# Patient Record
Sex: MALE | Race: WHITE | NOT HISPANIC OR LATINO | Employment: OTHER | RURAL
[De-identification: names, ages, dates, MRNs, and addresses within clinical notes are randomized per-mention and may not be internally consistent; named-entity substitution may affect disease eponyms.]

---

## 2023-01-01 ENCOUNTER — HOSPITAL ENCOUNTER (INPATIENT)
Facility: HOSPITAL | Age: 83
LOS: 21 days | DRG: 947 | End: 2023-05-25
Attending: HOSPITALIST | Admitting: HOSPITALIST
Payer: MEDICARE

## 2023-01-01 VITALS
SYSTOLIC BLOOD PRESSURE: 155 MMHG | RESPIRATION RATE: 24 BRPM | HEART RATE: 100 BPM | WEIGHT: 204.19 LBS | TEMPERATURE: 97 F | OXYGEN SATURATION: 91 % | BODY MASS INDEX: 32.82 KG/M2 | DIASTOLIC BLOOD PRESSURE: 73 MMHG | HEIGHT: 66 IN

## 2023-01-01 DIAGNOSIS — J96.00 ACUTE RESPIRATORY FAILURE: ICD-10-CM

## 2023-01-01 DIAGNOSIS — R53.81 PHYSICAL DECONDITIONING: Primary | ICD-10-CM

## 2023-01-01 LAB
ANION GAP SERPL CALCULATED.3IONS-SCNC: 5 MMOL/L (ref 7–16)
ANION GAP SERPL CALCULATED.3IONS-SCNC: 7 MMOL/L (ref 7–16)
ANION GAP SERPL CALCULATED.3IONS-SCNC: 8 MMOL/L (ref 7–16)
BASOPHILS # BLD AUTO: 0.03 K/UL (ref 0–0.2)
BASOPHILS # BLD AUTO: 0.04 K/UL (ref 0–0.2)
BASOPHILS # BLD AUTO: 0.07 K/UL (ref 0–0.2)
BASOPHILS NFR BLD AUTO: 0.3 % (ref 0–1)
BASOPHILS NFR BLD AUTO: 0.3 % (ref 0–1)
BASOPHILS NFR BLD AUTO: 0.6 % (ref 0–1)
BUN SERPL-MCNC: 11 MG/DL (ref 7–18)
BUN SERPL-MCNC: 11 MG/DL (ref 7–18)
BUN SERPL-MCNC: 15 MG/DL (ref 7–18)
BUN/CREAT SERPL: 17 (ref 6–20)
BUN/CREAT SERPL: 20 (ref 6–20)
BUN/CREAT SERPL: 22 (ref 6–20)
C COLI+JEJ+UPSA DNA STL QL NAA+NON-PROBE: NEGATIVE
CALCIUM SERPL-MCNC: 8.8 MG/DL (ref 8.5–10.1)
CALCIUM SERPL-MCNC: 8.8 MG/DL (ref 8.5–10.1)
CALCIUM SERPL-MCNC: 9 MG/DL (ref 8.5–10.1)
CHLORIDE SERPL-SCNC: 100 MMOL/L (ref 98–107)
CHLORIDE SERPL-SCNC: 95 MMOL/L (ref 98–107)
CHLORIDE SERPL-SCNC: 96 MMOL/L (ref 98–107)
CO2 SERPL-SCNC: 36 MMOL/L (ref 21–32)
CO2 SERPL-SCNC: 37 MMOL/L (ref 21–32)
CO2 SERPL-SCNC: 41 MMOL/L (ref 21–32)
CREAT SERPL-MCNC: 0.5 MG/DL (ref 0.7–1.3)
CREAT SERPL-MCNC: 0.64 MG/DL (ref 0.7–1.3)
CREAT SERPL-MCNC: 0.76 MG/DL (ref 0.7–1.3)
DIFFERENTIAL METHOD BLD: ABNORMAL
E COLI SXT1 STL QL IA: NEGATIVE
E COLI SXT2 STL QL IA: NEGATIVE
EGFR (NO RACE VARIABLE) (RUSH/TITUS): 103 ML/MIN/1.73M2
EGFR (NO RACE VARIABLE) (RUSH/TITUS): 91 ML/MIN/1.73M2
EGFR (NO RACE VARIABLE) (RUSH/TITUS): 96 ML/MIN/1.73M2
EOSINOPHIL # BLD AUTO: 0.15 K/UL (ref 0–0.5)
EOSINOPHIL # BLD AUTO: 0.15 K/UL (ref 0–0.5)
EOSINOPHIL # BLD AUTO: 0.48 K/UL (ref 0–0.5)
EOSINOPHIL NFR BLD AUTO: 1.1 % (ref 1–4)
EOSINOPHIL NFR BLD AUTO: 1.2 % (ref 1–4)
EOSINOPHIL NFR BLD AUTO: 4.3 % (ref 1–4)
ERYTHROCYTE [DISTWIDTH] IN BLOOD BY AUTOMATED COUNT: 16.7 % (ref 11.5–14.5)
ERYTHROCYTE [DISTWIDTH] IN BLOOD BY AUTOMATED COUNT: 17.2 % (ref 11.5–14.5)
ERYTHROCYTE [DISTWIDTH] IN BLOOD BY AUTOMATED COUNT: 18 % (ref 11.5–14.5)
GLUCOSE SERPL-MCNC: 132 MG/DL (ref 74–106)
GLUCOSE SERPL-MCNC: 133 MG/DL (ref 74–106)
GLUCOSE SERPL-MCNC: 142 MG/DL (ref 74–106)
HCT VFR BLD AUTO: 34.2 % (ref 40–54)
HCT VFR BLD AUTO: 35.7 % (ref 40–54)
HCT VFR BLD AUTO: 37.1 % (ref 40–54)
HGB BLD-MCNC: 10.3 G/DL (ref 13.5–18)
HGB BLD-MCNC: 10.8 G/DL (ref 13.5–18)
HGB BLD-MCNC: 11.2 G/DL (ref 13.5–18)
LYMPHOCYTES # BLD AUTO: 1.31 K/UL (ref 1–4.8)
LYMPHOCYTES # BLD AUTO: 1.44 K/UL (ref 1–4.8)
LYMPHOCYTES # BLD AUTO: 1.45 K/UL (ref 1–4.8)
LYMPHOCYTES NFR BLD AUTO: 10.4 % (ref 27–41)
LYMPHOCYTES NFR BLD AUTO: 10.6 % (ref 27–41)
LYMPHOCYTES NFR BLD AUTO: 12.9 % (ref 27–41)
MCH RBC QN AUTO: 26.5 PG (ref 27–31)
MCH RBC QN AUTO: 26.6 PG (ref 27–31)
MCH RBC QN AUTO: 26.7 PG (ref 27–31)
MCHC RBC AUTO-ENTMCNC: 30.1 G/DL (ref 32–36)
MCHC RBC AUTO-ENTMCNC: 30.2 G/DL (ref 32–36)
MCHC RBC AUTO-ENTMCNC: 30.3 G/DL (ref 32–36)
MCV RBC AUTO: 87.7 FL (ref 80–96)
MCV RBC AUTO: 87.9 FL (ref 80–96)
MCV RBC AUTO: 88.6 FL (ref 80–96)
MONOCYTES # BLD AUTO: 1.07 K/UL (ref 0–0.8)
MONOCYTES # BLD AUTO: 1.19 K/UL (ref 0–0.8)
MONOCYTES # BLD AUTO: 1.4 K/UL (ref 0–0.8)
MONOCYTES NFR BLD AUTO: 10 % (ref 2–6)
MONOCYTES NFR BLD AUTO: 10.7 % (ref 2–6)
MONOCYTES NFR BLD AUTO: 8.6 % (ref 2–6)
MPC BLD CALC-MCNC: 8.7 FL (ref 9.4–12.4)
MPC BLD CALC-MCNC: 9.1 FL (ref 9.4–12.4)
MPC BLD CALC-MCNC: 9.1 FL (ref 9.4–12.4)
NEUTROPHILS # BLD AUTO: 10.9 K/UL (ref 1.8–7.7)
NEUTROPHILS # BLD AUTO: 8.02 K/UL (ref 1.8–7.7)
NEUTROPHILS # BLD AUTO: 9.81 K/UL (ref 1.8–7.7)
NEUTROPHILS NFR BLD AUTO: 71.8 % (ref 53–65)
NEUTROPHILS NFR BLD AUTO: 78.2 % (ref 53–65)
NEUTROPHILS NFR BLD AUTO: 79 % (ref 53–65)
NOROVIRUS GI+II RNA STL QL NAA+NON-PROBE: NEGATIVE
PLATELET # BLD AUTO: 268 K/UL (ref 150–400)
PLATELET # BLD AUTO: 306 K/UL (ref 150–400)
PLATELET # BLD AUTO: 323 K/UL (ref 150–400)
POTASSIUM SERPL-SCNC: 3.1 MMOL/L (ref 3.5–5.1)
POTASSIUM SERPL-SCNC: 3.7 MMOL/L (ref 3.5–5.1)
POTASSIUM SERPL-SCNC: 3.8 MMOL/L (ref 3.5–5.1)
RBC # BLD AUTO: 3.86 M/UL (ref 4.6–6.2)
RBC # BLD AUTO: 4.06 M/UL (ref 4.6–6.2)
RBC # BLD AUTO: 4.23 M/UL (ref 4.6–6.2)
RVA RNA STL QL NAA+NON-PROBE: NEGATIVE
S ENT+BONG DNA STL QL NAA+NON-PROBE: NEGATIVE
SHIGELLA SPECIES NAT: NEGATIVE
SODIUM SERPL-SCNC: 136 MMOL/L (ref 136–145)
SODIUM SERPL-SCNC: 136 MMOL/L (ref 136–145)
SODIUM SERPL-SCNC: 142 MMOL/L (ref 136–145)
V CHOL+PARA+VUL DNA STL QL NAA+NON-PROBE: NEGATIVE
WBC # BLD AUTO: 11.16 K/UL (ref 4.5–11)
WBC # BLD AUTO: 12.41 K/UL (ref 4.5–11)
WBC # BLD AUTO: 13.94 K/UL (ref 4.5–11)
Y ENTEROCOL DNA STL QL NAA+NON-PROBE: NEGATIVE

## 2023-01-01 PROCEDURE — 27000987 HC MATTRESS, MATRIX LOW PROFILE

## 2023-01-01 PROCEDURE — 99307 PR NURSING FAC CARE, SUBSEQ, IMPROVING: ICD-10-PCS | Mod: ,,, | Performed by: HOSPITALIST

## 2023-01-01 PROCEDURE — 87506 IADNA-DNA/RNA PROBE TQ 6-11: CPT | Performed by: HOSPITALIST

## 2023-01-01 PROCEDURE — 27000958

## 2023-01-01 PROCEDURE — 99305 1ST NF CARE MODERATE MDM 35: CPT | Mod: AI,,, | Performed by: HOSPITALIST

## 2023-01-01 PROCEDURE — 25000003 PHARM REV CODE 250: Performed by: HOSPITALIST

## 2023-01-01 PROCEDURE — 97535 SELF CARE MNGMENT TRAINING: CPT

## 2023-01-01 PROCEDURE — 27000221 HC OXYGEN, UP TO 24 HOURS

## 2023-01-01 PROCEDURE — 25000003 PHARM REV CODE 250

## 2023-01-01 PROCEDURE — 25000003 PHARM REV CODE 250: Performed by: NURSE PRACTITIONER

## 2023-01-01 PROCEDURE — 99900035 HC TECH TIME PER 15 MIN (STAT)

## 2023-01-01 PROCEDURE — 97110 THERAPEUTIC EXERCISES: CPT

## 2023-01-01 PROCEDURE — 11000004 HC SNF PRIVATE

## 2023-01-01 PROCEDURE — 97530 THERAPEUTIC ACTIVITIES: CPT | Mod: CQ

## 2023-01-01 PROCEDURE — 94761 N-INVAS EAR/PLS OXIMETRY MLT: CPT

## 2023-01-01 PROCEDURE — 25000242 PHARM REV CODE 250 ALT 637 W/ HCPCS: Performed by: NURSE PRACTITIONER

## 2023-01-01 PROCEDURE — 97116 GAIT TRAINING THERAPY: CPT | Mod: CQ

## 2023-01-01 PROCEDURE — 97530 THERAPEUTIC ACTIVITIES: CPT

## 2023-01-01 PROCEDURE — 25000242 PHARM REV CODE 250 ALT 637 W/ HCPCS: Performed by: HOSPITALIST

## 2023-01-01 PROCEDURE — 30200315 PPD INTRADERMAL TEST REV CODE 302: Performed by: HOSPITALIST

## 2023-01-01 PROCEDURE — 94640 AIRWAY INHALATION TREATMENT: CPT

## 2023-01-01 PROCEDURE — 94760 N-INVAS EAR/PLS OXIMETRY 1: CPT

## 2023-01-01 PROCEDURE — 99308 PR NURSING FAC CARE, SUBSEQ, MINOR COMPLIC: ICD-10-PCS | Mod: ,,, | Performed by: HOSPITALIST

## 2023-01-01 PROCEDURE — 97110 THERAPEUTIC EXERCISES: CPT | Mod: CQ

## 2023-01-01 PROCEDURE — 80048 BASIC METABOLIC PNL TOTAL CA: CPT | Performed by: HOSPITALIST

## 2023-01-01 PROCEDURE — 86580 TB INTRADERMAL TEST: CPT | Performed by: HOSPITALIST

## 2023-01-01 PROCEDURE — 97116 GAIT TRAINING THERAPY: CPT

## 2023-01-01 PROCEDURE — 99310 PR NURSING FAC CARE, SUBSEQ, UNSTABLE PT: ICD-10-PCS | Mod: ,,, | Performed by: STUDENT IN AN ORGANIZED HEALTH CARE EDUCATION/TRAINING PROGRAM

## 2023-01-01 PROCEDURE — 99308 SBSQ NF CARE LOW MDM 20: CPT | Mod: ,,, | Performed by: HOSPITALIST

## 2023-01-01 PROCEDURE — 97163 PT EVAL HIGH COMPLEX 45 MIN: CPT

## 2023-01-01 PROCEDURE — 97166 OT EVAL MOD COMPLEX 45 MIN: CPT

## 2023-01-01 PROCEDURE — 99310 SBSQ NF CARE HIGH MDM 45: CPT | Mod: ,,, | Performed by: STUDENT IN AN ORGANIZED HEALTH CARE EDUCATION/TRAINING PROGRAM

## 2023-01-01 PROCEDURE — 85025 COMPLETE CBC W/AUTO DIFF WBC: CPT | Performed by: HOSPITALIST

## 2023-01-01 PROCEDURE — 63600175 PHARM REV CODE 636 W HCPCS: Performed by: NURSE PRACTITIONER

## 2023-01-01 PROCEDURE — 99307 SBSQ NF CARE SF MDM 10: CPT | Mod: ,,, | Performed by: HOSPITALIST

## 2023-01-01 PROCEDURE — 51702 INSERT TEMP BLADDER CATH: CPT

## 2023-01-01 PROCEDURE — 99900039 HC SLP GENERIC THERAPY SCREENING (STAT)

## 2023-01-01 PROCEDURE — 63600175 PHARM REV CODE 636 W HCPCS: Performed by: STUDENT IN AN ORGANIZED HEALTH CARE EDUCATION/TRAINING PROGRAM

## 2023-01-01 PROCEDURE — 25000242 PHARM REV CODE 250 ALT 637 W/ HCPCS

## 2023-01-01 PROCEDURE — 99305 PR NURSING FACILITY CARE, INIT, MOD SEVERITY: ICD-10-PCS | Mod: AI,,, | Performed by: HOSPITALIST

## 2023-01-01 RX ORDER — MORPHINE SULFATE 4 MG/ML
4 INJECTION, SOLUTION INTRAMUSCULAR; INTRAVENOUS ONCE
Status: COMPLETED | OUTPATIENT
Start: 2023-01-01 | End: 2023-01-01

## 2023-01-01 RX ORDER — ALBUTEROL SULFATE 0.83 MG/ML
2.5 SOLUTION RESPIRATORY (INHALATION) EVERY 4 HOURS PRN
Status: DISCONTINUED | OUTPATIENT
Start: 2023-01-01 | End: 2023-01-01 | Stop reason: HOSPADM

## 2023-01-01 RX ORDER — IPRATROPIUM BROMIDE AND ALBUTEROL SULFATE 2.5; .5 MG/3ML; MG/3ML
3 SOLUTION RESPIRATORY (INHALATION) EVERY 6 HOURS PRN
Status: DISCONTINUED | OUTPATIENT
Start: 2023-01-01 | End: 2023-01-01 | Stop reason: CLARIF

## 2023-01-01 RX ORDER — POTASSIUM CHLORIDE 20 MEQ/1
20 TABLET, EXTENDED RELEASE ORAL DAILY
Status: DISCONTINUED | OUTPATIENT
Start: 2023-01-01 | End: 2023-01-01

## 2023-01-01 RX ORDER — ACETAMINOPHEN 650 MG/1
650 SUPPOSITORY RECTAL EVERY 4 HOURS PRN
Status: DISCONTINUED | OUTPATIENT
Start: 2023-01-01 | End: 2023-01-01 | Stop reason: HOSPADM

## 2023-01-01 RX ORDER — HYDROXYZINE PAMOATE 25 MG/1
25 CAPSULE ORAL EVERY 6 HOURS PRN
Status: DISCONTINUED | OUTPATIENT
Start: 2023-01-01 | End: 2023-01-01

## 2023-01-01 RX ORDER — CLOPIDOGREL BISULFATE 75 MG/1
75 TABLET ORAL DAILY
Status: DISCONTINUED | OUTPATIENT
Start: 2023-01-01 | End: 2023-01-01

## 2023-01-01 RX ORDER — DILTIAZEM HYDROCHLORIDE 120 MG/1
240 CAPSULE, COATED, EXTENDED RELEASE ORAL DAILY
Status: DISCONTINUED | OUTPATIENT
Start: 2023-01-01 | End: 2023-01-01

## 2023-01-01 RX ORDER — IPRATROPIUM BROMIDE AND ALBUTEROL SULFATE 2.5; .5 MG/3ML; MG/3ML
3 SOLUTION RESPIRATORY (INHALATION) EVERY 6 HOURS
Status: DISCONTINUED | OUTPATIENT
Start: 2023-01-01 | End: 2023-01-01 | Stop reason: HOSPADM

## 2023-01-01 RX ORDER — ACETYLCYSTEINE 200 MG/ML
2 SOLUTION ORAL; RESPIRATORY (INHALATION) 3 TIMES DAILY PRN
Status: DISCONTINUED | OUTPATIENT
Start: 2023-01-01 | End: 2023-01-01 | Stop reason: HOSPADM

## 2023-01-01 RX ORDER — ROPINIROLE 0.5 MG/1
0.5 TABLET, FILM COATED ORAL 2 TIMES DAILY
COMMUNITY

## 2023-01-01 RX ORDER — MORPHINE SULFATE 4 MG/ML
4 INJECTION, SOLUTION INTRAMUSCULAR; INTRAVENOUS EVERY 4 HOURS PRN
Status: DISCONTINUED | OUTPATIENT
Start: 2023-01-01 | End: 2023-01-01 | Stop reason: HOSPADM

## 2023-01-01 RX ORDER — DILTIAZEM HYDROCHLORIDE 240 MG/1
240 CAPSULE, EXTENDED RELEASE ORAL DAILY
COMMUNITY
Start: 2023-01-01

## 2023-01-01 RX ORDER — FUROSEMIDE 20 MG/1
20 TABLET ORAL 2 TIMES DAILY
COMMUNITY

## 2023-01-01 RX ORDER — FLUTICASONE PROPIONATE AND SALMETEROL 250; 50 UG/1; UG/1
1 POWDER RESPIRATORY (INHALATION) 2 TIMES DAILY
Status: DISCONTINUED | OUTPATIENT
Start: 2023-01-01 | End: 2023-01-01

## 2023-01-01 RX ORDER — ALBUTEROL SULFATE 0.83 MG/ML
2.5 SOLUTION RESPIRATORY (INHALATION) EVERY 6 HOURS
Status: DISCONTINUED | OUTPATIENT
Start: 2023-01-01 | End: 2023-01-01 | Stop reason: ALTCHOICE

## 2023-01-01 RX ORDER — CHOLECALCIFEROL (VITAMIN D3) 25 MCG
2000 TABLET ORAL DAILY
COMMUNITY

## 2023-01-01 RX ORDER — TRAMADOL HYDROCHLORIDE 50 MG/1
50 TABLET ORAL EVERY 6 HOURS PRN
Status: DISCONTINUED | OUTPATIENT
Start: 2023-01-01 | End: 2023-01-01

## 2023-01-01 RX ORDER — FUROSEMIDE 40 MG/1
40 TABLET ORAL DAILY
Status: DISCONTINUED | OUTPATIENT
Start: 2023-01-01 | End: 2023-01-01

## 2023-01-01 RX ORDER — FERROUS SULFATE, DRIED 160(50) MG
1 TABLET, EXTENDED RELEASE ORAL DAILY
Status: DISCONTINUED | OUTPATIENT
Start: 2023-01-01 | End: 2023-01-01

## 2023-01-01 RX ORDER — ACETAMINOPHEN 325 MG/1
650 TABLET ORAL EVERY 6 HOURS PRN
Status: DISCONTINUED | OUTPATIENT
Start: 2023-01-01 | End: 2023-01-01

## 2023-01-01 RX ORDER — MUPIROCIN 20 MG/G
OINTMENT TOPICAL 2 TIMES DAILY
Status: DISCONTINUED | OUTPATIENT
Start: 2023-01-01 | End: 2023-01-01 | Stop reason: HOSPADM

## 2023-01-01 RX ORDER — MORPHINE SULFATE 4 MG/ML
4 INJECTION, SOLUTION INTRAMUSCULAR; INTRAVENOUS
Status: COMPLETED | OUTPATIENT
Start: 2023-01-01 | End: 2023-01-01

## 2023-01-01 RX ORDER — FLUTICASONE PROPIONATE AND SALMETEROL 250; 50 UG/1; UG/1
1 POWDER RESPIRATORY (INHALATION) 2 TIMES DAILY
COMMUNITY

## 2023-01-01 RX ORDER — CALCIUM CARBONATE 200(500)MG
500 TABLET,CHEWABLE ORAL 2 TIMES DAILY PRN
Status: DISCONTINUED | OUTPATIENT
Start: 2023-01-01 | End: 2023-01-01

## 2023-01-01 RX ORDER — POTASSIUM CHLORIDE 20 MEQ/1
40 TABLET, EXTENDED RELEASE ORAL DAILY
Status: DISCONTINUED | OUTPATIENT
Start: 2023-01-01 | End: 2023-01-01

## 2023-01-01 RX ORDER — ASPIRIN 81 MG/1
81 TABLET ORAL DAILY
COMMUNITY

## 2023-01-01 RX ORDER — BUDESONIDE 0.5 MG/2ML
0.5 INHALANT ORAL EVERY 12 HOURS
Status: DISCONTINUED | OUTPATIENT
Start: 2023-01-01 | End: 2023-01-01 | Stop reason: HOSPADM

## 2023-01-01 RX ORDER — MORPHINE SULFATE 4 MG/ML
3 INJECTION, SOLUTION INTRAMUSCULAR; INTRAVENOUS ONCE
Status: COMPLETED | OUTPATIENT
Start: 2023-01-01 | End: 2023-01-01

## 2023-01-01 RX ORDER — SERTRALINE HYDROCHLORIDE 25 MG/1
50 TABLET, FILM COATED ORAL NIGHTLY
Status: DISCONTINUED | OUTPATIENT
Start: 2023-01-01 | End: 2023-01-01

## 2023-01-01 RX ORDER — FUROSEMIDE 10 MG/ML
40 INJECTION INTRAMUSCULAR; INTRAVENOUS DAILY
Status: DISCONTINUED | OUTPATIENT
Start: 2023-01-01 | End: 2023-01-01 | Stop reason: HOSPADM

## 2023-01-01 RX ORDER — MORPHINE SULFATE 4 MG/ML
2 INJECTION, SOLUTION INTRAMUSCULAR; INTRAVENOUS
Status: DISCONTINUED | OUTPATIENT
Start: 2023-01-01 | End: 2023-01-01 | Stop reason: HOSPADM

## 2023-01-01 RX ORDER — CLOPIDOGREL BISULFATE 75 MG/1
75 TABLET ORAL DAILY
COMMUNITY
Start: 2022-01-01

## 2023-01-01 RX ORDER — SERTRALINE HYDROCHLORIDE 25 MG/1
50 TABLET, FILM COATED ORAL NIGHTLY
COMMUNITY

## 2023-01-01 RX ORDER — BUDESONIDE 0.5 MG/2ML
0.5 INHALANT ORAL EVERY 12 HOURS
Status: DISCONTINUED | OUTPATIENT
Start: 2023-01-01 | End: 2023-01-01

## 2023-01-01 RX ORDER — HYDROXYZINE PAMOATE 25 MG/1
50 CAPSULE ORAL NIGHTLY
Status: DISCONTINUED | OUTPATIENT
Start: 2023-01-01 | End: 2023-01-01

## 2023-01-01 RX ORDER — BUDESONIDE 0.5 MG/2ML
INHALANT ORAL
Status: COMPLETED
Start: 2023-01-01 | End: 2023-01-01

## 2023-01-01 RX ORDER — GUAIFENESIN 600 MG/1
600 TABLET, EXTENDED RELEASE ORAL 2 TIMES DAILY
Status: DISCONTINUED | OUTPATIENT
Start: 2023-01-01 | End: 2023-01-01

## 2023-01-01 RX ORDER — FUROSEMIDE 20 MG/1
20 TABLET ORAL 2 TIMES DAILY
Status: DISCONTINUED | OUTPATIENT
Start: 2023-01-01 | End: 2023-01-01

## 2023-01-01 RX ORDER — POTASSIUM CHLORIDE 20 MEQ/1
20 TABLET, EXTENDED RELEASE ORAL DAILY
COMMUNITY

## 2023-01-01 RX ORDER — SIMVASTATIN 40 MG/1
40 TABLET, FILM COATED ORAL NIGHTLY
COMMUNITY

## 2023-01-01 RX ORDER — ATORVASTATIN CALCIUM 20 MG/1
20 TABLET, FILM COATED ORAL NIGHTLY
Status: DISCONTINUED | OUTPATIENT
Start: 2023-01-01 | End: 2023-01-01

## 2023-01-01 RX ORDER — TALC
6 POWDER (GRAM) TOPICAL NIGHTLY PRN
Status: DISCONTINUED | OUTPATIENT
Start: 2023-01-01 | End: 2023-01-01

## 2023-01-01 RX ORDER — AMOXICILLIN 250 MG
1 CAPSULE ORAL 2 TIMES DAILY PRN
Status: DISCONTINUED | OUTPATIENT
Start: 2023-01-01 | End: 2023-01-01

## 2023-01-01 RX ORDER — ROPINIROLE 0.25 MG/1
0.5 TABLET, FILM COATED ORAL 2 TIMES DAILY
Status: DISCONTINUED | OUTPATIENT
Start: 2023-01-01 | End: 2023-01-01

## 2023-01-01 RX ORDER — ASPIRIN 81 MG/1
81 TABLET ORAL DAILY
Status: DISCONTINUED | OUTPATIENT
Start: 2023-01-01 | End: 2023-01-01

## 2023-01-01 RX ADMIN — MUPIROCIN: 20 OINTMENT TOPICAL at 10:05

## 2023-01-01 RX ADMIN — ROPINIROLE 0.5 MG: 0.25 TABLET, FILM COATED ORAL at 08:05

## 2023-01-01 RX ADMIN — FLUTICASONE PROPIONATE AND SALMETEROL 1 PUFF: 250; 50 POWDER RESPIRATORY (INHALATION) at 07:05

## 2023-01-01 RX ADMIN — FLUTICASONE PROPIONATE AND SALMETEROL 1 PUFF: 250; 50 POWDER RESPIRATORY (INHALATION) at 08:05

## 2023-01-01 RX ADMIN — ZINC OXIDE TOPICAL OINT.: at 08:05

## 2023-01-01 RX ADMIN — CLOPIDOGREL BISULFATE 75 MG: 75 TABLET, FILM COATED ORAL at 09:05

## 2023-01-01 RX ADMIN — Medication 1 TABLET: at 08:05

## 2023-01-01 RX ADMIN — ACETAMINOPHEN 650 MG: 325 TABLET ORAL at 04:05

## 2023-01-01 RX ADMIN — SERTRALINE HYDROCHLORIDE 50 MG: 25 TABLET ORAL at 08:05

## 2023-01-01 RX ADMIN — GUAIFENESIN 600 MG: 600 TABLET ORAL at 08:05

## 2023-01-01 RX ADMIN — TRAMADOL HYDROCHLORIDE 50 MG: 50 TABLET, COATED ORAL at 08:05

## 2023-01-01 RX ADMIN — DILTIAZEM HYDROCHLORIDE 240 MG: 120 CAPSULE, COATED, EXTENDED RELEASE ORAL at 08:05

## 2023-01-01 RX ADMIN — TRAMADOL HYDROCHLORIDE 50 MG: 50 TABLET, COATED ORAL at 01:05

## 2023-01-01 RX ADMIN — HYDROXYZINE PAMOATE 25 MG: 25 CAPSULE ORAL at 08:05

## 2023-01-01 RX ADMIN — BUDESONIDE INHALATION 0.5 MG: 0.5 SUSPENSION RESPIRATORY (INHALATION) at 08:05

## 2023-01-01 RX ADMIN — ATORVASTATIN CALCIUM 20 MG: 20 TABLET, FILM COATED ORAL at 08:05

## 2023-01-01 RX ADMIN — MELATONIN 6 MG: at 09:05

## 2023-01-01 RX ADMIN — TRAMADOL HYDROCHLORIDE 50 MG: 50 TABLET, COATED ORAL at 10:05

## 2023-01-01 RX ADMIN — CLOPIDOGREL BISULFATE 75 MG: 75 TABLET, FILM COATED ORAL at 08:05

## 2023-01-01 RX ADMIN — ALBUTEROL SULFATE 2.5 MG: 2.5 SOLUTION RESPIRATORY (INHALATION) at 09:05

## 2023-01-01 RX ADMIN — ASPIRIN 81 MG: 81 TABLET, COATED ORAL at 08:05

## 2023-01-01 RX ADMIN — POTASSIUM CHLORIDE 20 MEQ: 1500 TABLET, EXTENDED RELEASE ORAL at 08:05

## 2023-01-01 RX ADMIN — SERTRALINE HYDROCHLORIDE 50 MG: 25 TABLET ORAL at 09:05

## 2023-01-01 RX ADMIN — ACETAMINOPHEN 650 MG: 325 TABLET ORAL at 12:05

## 2023-01-01 RX ADMIN — ACETAMINOPHEN 650 MG: 325 TABLET ORAL at 09:05

## 2023-01-01 RX ADMIN — FUROSEMIDE 40 MG: 40 TABLET ORAL at 09:05

## 2023-01-01 RX ADMIN — SENNOSIDES AND DOCUSATE SODIUM 1 TABLET: 8.6; 5 TABLET ORAL at 09:05

## 2023-01-01 RX ADMIN — FUROSEMIDE 40 MG: 40 TABLET ORAL at 08:05

## 2023-01-01 RX ADMIN — BUDESONIDE INHALATION 0.5 MG: 0.5 SUSPENSION RESPIRATORY (INHALATION) at 07:05

## 2023-01-01 RX ADMIN — MORPHINE SULFATE 3 MG: 4 INJECTION INTRAVENOUS at 12:05

## 2023-01-01 RX ADMIN — ALBUTEROL SULFATE 2.5 MG: 2.5 SOLUTION RESPIRATORY (INHALATION) at 06:05

## 2023-01-01 RX ADMIN — GUAIFENESIN 600 MG: 600 TABLET ORAL at 09:05

## 2023-01-01 RX ADMIN — TRAMADOL HYDROCHLORIDE 50 MG: 50 TABLET, COATED ORAL at 09:05

## 2023-01-01 RX ADMIN — TRAMADOL HYDROCHLORIDE 50 MG: 50 TABLET, COATED ORAL at 05:05

## 2023-01-01 RX ADMIN — ACETAMINOPHEN 650 MG: 325 TABLET ORAL at 06:05

## 2023-01-01 RX ADMIN — MELATONIN 6 MG: at 08:05

## 2023-01-01 RX ADMIN — HYDROXYZINE PAMOATE 25 MG: 25 CAPSULE ORAL at 04:05

## 2023-01-01 RX ADMIN — ALBUTEROL SULFATE 2.5 MG: 2.5 SOLUTION RESPIRATORY (INHALATION) at 07:05

## 2023-01-01 RX ADMIN — HYDROXYZINE PAMOATE 25 MG: 25 CAPSULE ORAL at 09:05

## 2023-01-01 RX ADMIN — ASPIRIN 81 MG: 81 TABLET, COATED ORAL at 09:05

## 2023-01-01 RX ADMIN — HYDROXYZINE PAMOATE 25 MG: 25 CAPSULE ORAL at 07:05

## 2023-01-01 RX ADMIN — MORPHINE SULFATE 2 MG: 4 INJECTION INTRAVENOUS at 08:05

## 2023-01-01 RX ADMIN — TRAMADOL HYDROCHLORIDE 50 MG: 50 TABLET, COATED ORAL at 03:05

## 2023-01-01 RX ADMIN — ACETAMINOPHEN 650 MG: 325 TABLET ORAL at 01:05

## 2023-01-01 RX ADMIN — TRAMADOL HYDROCHLORIDE 50 MG: 50 TABLET, COATED ORAL at 06:05

## 2023-01-01 RX ADMIN — ALBUTEROL SULFATE 2.5 MG: 2.5 SOLUTION RESPIRATORY (INHALATION) at 01:05

## 2023-01-01 RX ADMIN — ATORVASTATIN CALCIUM 20 MG: 20 TABLET, FILM COATED ORAL at 09:05

## 2023-01-01 RX ADMIN — ACETYLCYSTEINE 2 ML: 200 SOLUTION ORAL; RESPIRATORY (INHALATION) at 03:05

## 2023-01-01 RX ADMIN — MORPHINE SULFATE 4 MG: 4 INJECTION INTRAVENOUS at 10:05

## 2023-01-01 RX ADMIN — IPRATROPIUM BROMIDE AND ALBUTEROL SULFATE 3 ML: .5; 3 SOLUTION RESPIRATORY (INHALATION) at 06:05

## 2023-01-01 RX ADMIN — ACETYLCYSTEINE 2 ML: 200 SOLUTION ORAL; RESPIRATORY (INHALATION) at 09:05

## 2023-01-01 RX ADMIN — ALBUTEROL SULFATE 2.5 MG: 2.5 SOLUTION RESPIRATORY (INHALATION) at 08:05

## 2023-01-01 RX ADMIN — IPRATROPIUM BROMIDE AND ALBUTEROL SULFATE 3 ML: .5; 3 SOLUTION RESPIRATORY (INHALATION) at 12:05

## 2023-01-01 RX ADMIN — HYDROXYZINE PAMOATE 25 MG: 25 CAPSULE ORAL at 02:05

## 2023-01-01 RX ADMIN — ACETYLCYSTEINE 2 ML: 200 SOLUTION ORAL; RESPIRATORY (INHALATION) at 10:05

## 2023-01-01 RX ADMIN — POTASSIUM CHLORIDE 40 MEQ: 1500 TABLET, EXTENDED RELEASE ORAL at 08:05

## 2023-01-01 RX ADMIN — MORPHINE SULFATE 4 MG: 4 INJECTION INTRAVENOUS at 05:05

## 2023-01-01 RX ADMIN — ACETAMINOPHEN 650 MG: 325 TABLET ORAL at 05:05

## 2023-01-01 RX ADMIN — TUBERCULIN PURIFIED PROTEIN DERIVATIVE 5 UNITS: 5 INJECTION INTRADERMAL at 03:05

## 2023-01-01 RX ADMIN — TRAMADOL HYDROCHLORIDE 50 MG: 50 TABLET, COATED ORAL at 04:05

## 2023-01-01 RX ADMIN — ACETYLCYSTEINE 2 ML: 200 SOLUTION ORAL; RESPIRATORY (INHALATION) at 07:05

## 2023-01-01 RX ADMIN — HYDROXYZINE PAMOATE 25 MG: 25 CAPSULE ORAL at 06:05

## 2023-01-01 RX ADMIN — ACETAMINOPHEN 650 MG: 325 TABLET ORAL at 03:05

## 2023-01-01 RX ADMIN — HYDROXYZINE PAMOATE 25 MG: 25 CAPSULE ORAL at 11:05

## 2023-01-01 RX ADMIN — HYDROXYZINE PAMOATE 25 MG: 25 CAPSULE ORAL at 01:05

## 2023-01-01 RX ADMIN — DILTIAZEM HYDROCHLORIDE 240 MG: 120 CAPSULE, COATED, EXTENDED RELEASE ORAL at 09:05

## 2023-01-01 RX ADMIN — ZINC OXIDE TOPICAL OINT.: at 09:05

## 2023-01-01 RX ADMIN — TRAMADOL HYDROCHLORIDE 50 MG: 50 TABLET, COATED ORAL at 11:05

## 2023-01-01 RX ADMIN — TRAMADOL HYDROCHLORIDE 50 MG: 50 TABLET, COATED ORAL at 12:05

## 2023-01-01 RX ADMIN — HYDROXYZINE PAMOATE 25 MG: 25 CAPSULE ORAL at 10:05

## 2023-01-01 RX ADMIN — MORPHINE SULFATE 4 MG: 4 INJECTION INTRAVENOUS at 02:05

## 2023-01-01 RX ADMIN — ACETAMINOPHEN 650 MG: 325 TABLET ORAL at 11:05

## 2023-01-01 RX ADMIN — ALBUTEROL SULFATE 2.5 MG: 2.5 SOLUTION RESPIRATORY (INHALATION) at 03:05

## 2023-01-01 RX ADMIN — POTASSIUM CHLORIDE 20 MEQ: 1500 TABLET, EXTENDED RELEASE ORAL at 09:05

## 2023-01-01 RX ADMIN — Medication 1 TABLET: at 09:05

## 2023-01-01 RX ADMIN — BUDESONIDE INHALATION 0.5 MG: 0.5 SUSPENSION RESPIRATORY (INHALATION) at 09:05

## 2023-01-01 RX ADMIN — ZINC OXIDE TOPICAL OINT.: at 12:05

## 2023-01-01 RX ADMIN — ALBUTEROL SULFATE 2.5 MG: 2.5 SOLUTION RESPIRATORY (INHALATION) at 12:05

## 2023-01-01 RX ADMIN — POTASSIUM CHLORIDE 40 MEQ: 1500 TABLET, EXTENDED RELEASE ORAL at 09:05

## 2023-01-01 RX ADMIN — ACETAMINOPHEN 650 MG: 325 TABLET ORAL at 08:05

## 2023-01-01 RX ADMIN — ACETAMINOPHEN 650 MG: 325 TABLET ORAL at 10:05

## 2023-01-01 RX ADMIN — HYDROXYZINE PAMOATE 25 MG: 25 CAPSULE ORAL at 03:05

## 2023-01-01 RX ADMIN — ACETYLCYSTEINE 2 ML: 200 SOLUTION ORAL; RESPIRATORY (INHALATION) at 01:05

## 2023-01-01 RX ADMIN — TRAMADOL HYDROCHLORIDE 50 MG: 50 TABLET, COATED ORAL at 02:05

## 2023-01-01 RX ADMIN — TRAMADOL HYDROCHLORIDE 50 MG: 50 TABLET, COATED ORAL at 07:05

## 2023-05-04 NOTE — PLAN OF CARE
Problem: Adjustment to Illness COPD (Chronic Obstructive Pulmonary Disease)  Goal: Optimal Chronic Illness Coping  Outcome: Ongoing, Progressing     Problem: Functional Ability Impaired COPD (Chronic Obstructive Pulmonary Disease)  Goal: Optimal Level of Functional [place]  Outcome: Ongoing, Progressing     Problem: Infection COPD (Chronic Obstructive Pulmonary Disease)  Goal: Absence of Infection Signs and Symptoms  Outcome: Ongoing, Progressing     Problem: Oral Intake Inadequate COPD (Chronic Obstructive Pulmonary Disease)  Goal: Improved Nutrition Intake  Outcome: Ongoing, Progressing     Problem: Respiratory Compromise COPD (Chronic Obstructive Pulmonary Disease)  Goal: Effective Oxygenation and Ventilation  Outcome: Ongoing, Progressing

## 2023-05-04 NOTE — PLAN OF CARE
Problem: Fatigue  Goal: Improved Activity Tolerance  Outcome: Ongoing, Progressing  Intervention: Promote Improved Energy  Flowsheets (Taken 5/4/2023 1831)  Fatigue Management:   activity assistance provided   fatigue-related activity identified   paced activity encouraged   frequent rest breaks encouraged  Sleep/Rest Enhancement:   awakenings minimized   family presence promoted  Activity Management:   Standing - L3   Sitting at edge of bed - L2   Rolling - L1   Plan of care reviewed with patient. Patients status ongoing progressing.

## 2023-05-05 PROBLEM — I25.10 CAD (CORONARY ARTERY DISEASE): Status: ACTIVE | Noted: 2023-01-01

## 2023-05-05 PROBLEM — I50.22 CHRONIC SYSTOLIC HEART FAILURE: Status: ACTIVE | Noted: 2023-01-01

## 2023-05-05 PROBLEM — I10 HTN (HYPERTENSION): Status: ACTIVE | Noted: 2023-01-01

## 2023-05-05 PROBLEM — R29.898 WEAKNESS OF BOTH LOWER EXTREMITIES: Status: ACTIVE | Noted: 2023-01-01

## 2023-05-05 PROBLEM — R53.81 PHYSICAL DECONDITIONING: Status: ACTIVE | Noted: 2023-01-01

## 2023-05-05 PROBLEM — J44.9 CHRONIC OBSTRUCT AIRWAYS DISEASE: Status: ACTIVE | Noted: 2023-01-01

## 2023-05-05 NOTE — PLAN OF CARE
Problem: Adult Inpatient Plan of Care  Goal: Plan of Care Review  Outcome: Ongoing, Progressing  Goal: Patient-Specific Goal (Individualized)  Outcome: Ongoing, Progressing  Goal: Absence of Hospital-Acquired Illness or Injury  Outcome: Ongoing, Progressing  Goal: Optimal Comfort and Wellbeing  Outcome: Ongoing, Progressing  Goal: Readiness for Transition of Care  Outcome: Ongoing, Progressing     Problem: Fall Injury Risk  Goal: Absence of Fall and Fall-Related Injury  Outcome: Ongoing, Progressing     Problem: Pain Acute  Goal: Acceptable Pain Control and Functional Ability  Outcome: Ongoing, Progressing     Problem: Fatigue  Goal: Improved Activity Tolerance  Outcome: Ongoing, Progressing     Problem: Skin Injury Risk Increased  Goal: Skin Health and Integrity  Outcome: Ongoing, Progressing

## 2023-05-05 NOTE — PT/OT/SLP EVAL
"Physical Therapy Evaluation    Patient Name:  Shaji Welsh   MRN:  [id number]    Recommendations:     Discharge Recommendations: home with home health   Discharge Equipment Recommendations: none   Barriers to discharge: Inaccessible home and Decreased caregiver support    Assessment:     Shaji Welsh is a 82 y.o. male admitted with a medical diagnosis of Physical deconditioning.  He presents with the following impairments/functional limitations: weakness, impaired endurance, impaired functional mobility, gait instability, impaired balance, decreased lower extremity function, pain, impaired muscle length.    Pt has had 2 hospitalizations since 2/2023 for SOB, CHF, CAP. Latest one has lasted 5 weeks. Pt had home health  PT prior to [facility] who issued him a RW. He has had 3 falls indoors and outdoors in past 3 months. Pt appears to lumbar radiculopathy LLE with numbness and weakness compared to RLE.     Rehab Prognosis: Fair; patient would benefit from acute skilled PT services to address these deficits and reach maximum level of function.   Recent Surgery: * No surgery found *       SUPERVISORY VISIT WITH MARGUERITE ANDRE PTA to discuss results of eval, POC, goals and treatment options. Discussed LLE weakness which appears to be related to lumbar radiculopathy. Pt to avoid trunk flex and rotation. Do lumbar extension work to reduce disc bulge. Understanding expressed.  Plan:     During this hospitalization, patient to be seen 5 x/week to address the identified rehab impairments via gait training, therapeutic activities, therapeutic exercises and progress toward the following goals:    Plan of Care Expires:  06/02/23    Subjective     Chief Complaint: Pt reports health declining past 6 months since he had heart stent 11/2022. He was schedule for myelogram last Fall due to "pinched nerve" X 8 months, but it was cancelled d/t heart stent surgery. Pt reports LLE "numb" and weak compared to RLE.  Patient/Family Comments/goals: " "Pt to dc home when safe and functional.  Pain/Comfort:  Pain Rating 1: 4/10  Location - Side 1: Left  Location - Orientation 1: medial  Location 1: back  Pain Addressed 1: Reposition, Other (see comments) (proper posture, lumbar trunk ext)  Pain Rating Post-Intervention 1: 1/10    Patients cultural, spiritual, Mandaeism conflicts given the current situation: no    Living Environment:  Pt lives with his wife in a single-story home. Has 4" threshold step at carport. Pt's wife is not in good health and unable to help pt.  Prior to admission, patients level of function was modif indep using RW in the home. Had 3 falls in past 3 months. Was receiving HHPT.  Equipment used at home: shower chair, walker, rolling, bedside commode, grab bar.  DME owned (not currently used): standard walker.  Upon discharge, patient will have assistance from HOME HEALTH.    Objective:     Communicated with DEMARCUS Cantor, pt/pt's son prior to session.  Patient found up in chair with oxygen  upon PT entry to room.    General Precautions: Standard, fall, respiratory  Orthopedic Precautions:spinal precautions (avoid trunk flexion and rotation due to back pain with LLE radiculopathy.)   Braces:    Respiratory Status: Room air    Exams:  Cognitive Exam:  Patient is oriented to Person, Place, Time, and Situation  RLE ROM: Deficits: tight calf  RLE Strength: Deficits: hip flex=3/5, hip ext=4-/5, hip abd=4-/5, knee ext=4/5, DF=3+/5, PF=4+/5  LLE ROM: Deficits: tight calf  LLE Strength: Deficits:  hip flex=1+/5, hip ext=3+/5, hip abd=3-/5, knee ext=3-/5, DF=3-/5, PF=3-/5    Functional Mobility:  Transfers:     Sit to Stand:  maximal assistance with rolling walker  Bed to Chair: moderate assistance with  rolling walker  using  Stand Pivot  Gait: amb'd 18 ft with min/mod A using RW/O2/wc trail. Unstable left knee with hx buckling but not observed during eval. Lacks step through. Lacks heel strike. Lacks left foot clearance. Unsteady.      AM-PAC 6 CLICK " "MOBILITY  Total Score:13       Treatment & Education:  Eval completed. Pt/son instructed in proper posture in supine and sitting using lumbar roll. Instructed in proper placement in low back to maintain lumbar lordosis. Pt/son instructed in stretching soleus in sitting, LAQ and lumbar trunk ext in sitting to reduce disc bulge.     Patient left  up in [facility] [place] bedside chair due to increased pt comfort  with all lines intact and son present.    GOALS:   Multidisciplinary Problems       Physical Therapy Goals          Problem: Physical Therapy    Goal Priority Disciplines Outcome Goal Variances Interventions   Physical Therapy Goal     PT, PT/OT Ongoing, Progressing     Description: STG - 2 weeks  1. Pt will be CGA with transfers.  2. Pt will be CGA with bed mob.  3. Pt will be min A/CGA to amb 75 ft using RW.  4. Pt will have ROM seth ankle DF WFL.    LTG - 4 weeks  1. Pt will be SBA with transfers.  2. Pt will be SBA with bed mob.  3. Pt will be SBA to amb 150 ft using RW.  4. Pt will gain 1/2 muscle grade strength BLE.  5. Pt's LBP will be abolished.  6. Pt will negotiate 4" step with SBA using RW.                       History:     Past Medical History:   Diagnosis Date    CHF (congestive heart failure)     COPD (chronic obstructive pulmonary disease)     Coronary artery disease        History reviewed. No pertinent surgical history.    Time Tracking:     PT Received On: 05/05/23  PT Start Time: 1233     PT Stop Time: 1325  PT Total Time (min): 52 min     Billable Minutes: Evaluation 37, Therapeutic Exercise 15, and Total Time 52      05/05/2023  "

## 2023-05-05 NOTE — SUBJECTIVE & OBJECTIVE
Past Medical History:   Diagnosis Date    CHF (congestive heart failure)     COPD (chronic obstructive pulmonary disease)     Coronary artery disease        History reviewed. No pertinent surgical history.    Review of patient's allergies indicates:  No Known Allergies    No current facility-administered medications on file prior to encounter.     Current Outpatient Medications on File Prior to Encounter   Medication Sig    clopidogreL (PLAVIX) 75 mg tablet Take 75 mg by mouth once daily.    diltiaZEM (TIAZAC) 240 MG Cs24 Take 240 mg by mouth once daily.    aspirin (ECOTRIN) 81 MG EC tablet Take 81 mg by mouth once daily.    fluticasone-salmeterol diskus inhaler 250-50 mcg Inhale 1 puff into the lungs 2 (two) times a day.    furosemide (LASIX) 20 MG tablet Take 20 mg by mouth 2 (two) times a day.    potassium chloride SA (K-DUR,KLOR-CON) 20 MEQ tablet Take 20 mEq by mouth once daily.    rOPINIRole (REQUIP) 0.5 MG tablet Take 0.5 mg by mouth 2 (two) times a day.    sertraline (ZOLOFT) 25 MG tablet Take 50 mg by mouth every evening.    simvastatin (ZOCOR) 40 MG tablet Take 40 mg by mouth every evening.    vitamin D (VITAMIN D3) 1000 units Tab Take 2,000 Units by mouth once daily.     Family History    None       Tobacco Use    Smoking status: Former     Packs/day: 2.00     Types: Cigarettes    Smokeless tobacco: Never   Substance and Sexual Activity    Alcohol use: Not Currently    Drug use: Never    Sexual activity: Not Currently     Review of Systems   Constitutional:  Negative for appetite change, chills and fever.   Respiratory:  Positive for cough and shortness of breath. Negative for wheezing.    Cardiovascular:  Negative for chest pain, palpitations and leg swelling.   Gastrointestinal:  Negative for abdominal distention, diarrhea, nausea and vomiting.   Genitourinary:  Negative for dysuria.   Musculoskeletal:  Positive for arthralgias and back pain.   Skin:  Negative for rash.   Neurological:  Positive for  weakness. Negative for dizziness, seizures and syncope.   Psychiatric/Behavioral:  Negative for agitation, behavioral problems and confusion.    All other systems reviewed and are negative.  Objective:     Vital Signs (Most Recent):  Temp: 97.7 °F (36.5 °C) (05/05/23 0804)  Pulse: 83 (05/05/23 0804)  Resp: 20 (05/05/23 0804)  BP: 131/81 (05/05/23 0804)  SpO2: 96 % (05/05/23 0804) Vital Signs (24h Range):  Temp:  [97.7 °F (36.5 °C)-99 °F (37.2 °C)] 97.7 °F (36.5 °C)  Pulse:  [78-85] 83  Resp:  [20-24] 20  SpO2:  [94 %-97 %] 96 %  BP: (131-149)/(53-81) 131/81     Weight: 91.1 kg (200 lb 14.4 oz)  Body mass index is 32.43 kg/m².     Physical Exam  Vitals reviewed.   Constitutional:       General: He is not in acute distress.     Appearance: Normal appearance.   HENT:      Head: Normocephalic and atraumatic.   Eyes:      General: No scleral icterus.     Extraocular Movements: Extraocular movements intact.      Conjunctiva/sclera: Conjunctivae normal.      Pupils: Pupils are equal, round, and reactive to light.   Cardiovascular:      Rate and Rhythm: Normal rate and regular rhythm.      Heart sounds: No murmur heard.    No friction rub. No gallop.   Pulmonary:      Effort: Pulmonary effort is normal. No respiratory distress.      Breath sounds: No wheezing, rhonchi or rales.      Comments: Diminished bilaterally   Abdominal:      General: Abdomen is flat. Bowel sounds are normal. There is no distension.      Palpations: Abdomen is soft.      Tenderness: There is no abdominal tenderness. There is no guarding.   Musculoskeletal:         General: No swelling.      Right lower leg: No edema.      Left lower leg: No edema.   Skin:     General: Skin is warm and dry.      Coloration: Skin is not jaundiced.      Findings: No rash.   Neurological:      General: No focal deficit present.      Mental Status: He is alert and oriented to person, place, and time.      Sensory: No sensory deficit.      Motor: Weakness present.    Psychiatric:         Mood and Affect: Mood normal.         Behavior: Behavior normal.         Thought Content: Thought content normal.         Judgment: Judgment normal.            CRANIAL NERVES     CN III, IV, VI   Pupils are equal, round, and reactive to light.     Significant Labs: All pertinent labs within the past 24 hours have been reviewed.  Recent Lab Results         05/05/23  0520        Anion Gap 5       Baso # 0.03       Basophil % 0.3       BUN 11       BUN/CREAT RATIO 17       Calcium 8.8       Chloride 100       CO2 41       Creatinine 0.64       Differential Type Auto       eGFR 96       Eos # 0.48       Eosinophil % 4.3       Glucose 142       Hematocrit 34.2       Hemoglobin 10.3       Lymph # 1.44       Lymph % 12.9       MCH 26.7       MCHC 30.1       MCV 88.6       Mono # 1.19       Mono % 10.7       MPV 8.7       Neutrophils, Abs 8.02       Neutrophils Relative 71.8       Platelets 323       Potassium 3.8       RBC 3.86       RDW 18.0       Sodium 142       WBC 11.16               Significant Imaging: I have reviewed all pertinent imaging results/findings within the past 24 hours.

## 2023-05-05 NOTE — HOSPITAL COURSE
5/4 Patient is sitting up in Luciano chair watching TV in NAD. Son is at bedside. He is pleasant and denies any discomfort or sob at present. 1+ edema noted BLE. Patient encouraged to elevate lower extremities as much as possible. Patient appears upbeat about PT/OT and expresses hope it will strengthen his lower extremities and increase his mobility and independence.    5/8 Seen today, no major issues.  Went to therapy.  Sitting in WC today.  No complaints.     5/10 Asking about Mucinex for sputum.  Will add that BID as well as Mucomyst prn.  Also discussed with patient and family about use of disrespectful language and cursing towards our staff.  Told them it will not be tolerated and next time it happens he will be discharged home with home health.  I instructed him to tell family to apologize to staff.     5/12 Trying some nebs and mucomyst for sputum relief and production.  Doing well with therapy.  Showing improvement.     5/15 Seems better today, feels better. No major issues. Feels like he is getting stronger.     5/17 [facility]  Will refer.  Continues to do therapy.     5/19 No major issues, K a little low this am will adjust PO K supplements.     5/22 Sleepy this am, continues therapy.  Looking at NH placement and referrals sent.     5/24 1300 called to pt room by Genia TRACY.  Pt daughter reports is grimacing and increased work of breathing.  She request pain control and comfort measures only.  When asked pt if he is in pain, he nodded head. He did not respond verbally but is grimacing.  Pt's daughter Marta Cottrell reports that he recently signed his DNR and he nor the family want heroic efforts for his worsening condition.  She reports they talked about hospice yesterday.  Will treat pain and contact MD on call to discuss further action. Pt son and grand-daughter also in the room agree with comfort care of patient.      5/24 1325 Discussed pt status, family wishes and POC with Dr. Garsia  who agrees with comfort care, change lasix to IV , ribera and prn Morphine.      0400 Pt with no spontaneous respirations, no apical pulse. Pt dead.  Pt wife and 2 sons at BS.  Discussed pt death with Dr. Azar.  Will release to  home.

## 2023-05-05 NOTE — PLAN OF CARE
Problem: Occupational Therapy  Goal: Occupational Therapy Goal  Description: STG: within 2 weeks  Pt will perform grooming with setup and independence at sinkside from sitting or standing  Pt will bathe with SBA with spongebathing at sinkside  Pt will perform UE dressing with setup and independence  Pt will perform LE dressing with SBA with AE   Pt will sit EOB x 30 min with no assistance  Pt will transfer bed/chair/bsc with mod i  Pt will perform standing task x 5 min with svn assistance  Pt will tolerate 45 minutes of tx without fatigue      LT.Restore to max I with self care and mobility.     Outcome: Ongoing, Progressing

## 2023-05-05 NOTE — NURSING
"It has been a rough night with Mr. Welsh and his son.  At or about 2010, Patient's son called on the call light that he needed some help.  The CNA began to get gloves and gather supplies, when he open the door, came into the amaral and stated yall are just sitting there not doing a God Damn thing, and went back into the room.  Both CNA's worked to clean patient from a BM, clean clothes, and position in the Mignon Chair(patient request recliner--this is the closest we have to recliner).   This RN did tell Mr. Welsh's son that because no one was in the room as soon as he hit the call light that does not mean that we are sitting doing nothing.  He looked at me and said "you need to chill out".  I told him that perhaps there needs to be some chilling on his side.    I later administered patient's night time meds with no issues.  Made several attempts to help patient get comfortable, both  in bed and in the chair.  At or about 2300 patient's son came to desk saying that his dad is having a panic attack and needs to be out of bed and in the chair.  We sat patient up in chair, patient is saying he can not breath, I asked Respiratory to assist.  Patient's O2 sats are 96%. On 2 liters via NC.  I Encouraged patient to breath through his nose and out through his mouth and relax.  Leóni came in to lend a hand with adjusting the bed, but that did not satisfy.  The son began to curse again and saying he would just take his daddy out of here, as we have no recliners, no anxiety meds and his dad can not get comfortable.   We advised him that he certainly can do that, and the AMA form is available.   Patient then reported that his back side is hurting from sitting in the chair, I administered two tylenol.  Patient requested to remain up in the chair, stating I can not rest in that damn bed.    "

## 2023-05-05 NOTE — H&P
[facility] Medicine  History & Physical    Patient Name: Shaji Welsh  MRN: [id number]  Patient Class: IP- Swing  Admission Date: 5/4/2023  Attending Physician: Jean Pierre Chappell DO   Primary Care Provider: LIZETTE Taylor         Patient information was obtained from patient, past medical records and ER records.     Subjective:     Principal Problem:Physical deconditioning    Chief Complaint: No chief complaint on file.       HPI: Mr. Welsh is a 81 y/o WM with a PMH of HTN, CHF, COPD, CAD s/p stent, and anxiety disorder who presents for admission to [facility] Swing Bed services. He was previously seen at [facility] on 4/5/23 with increased SOB. He was found to have CAP and CHF and treated with Lasix with no improvement of symptoms. He was then transferred to [facility] at family request for higher level of care. CTA PE protocol was performed and negative for PE. He was also evaluated for worsening left leg weakness with onset 11/22 and new onset right leg weakness. A MRI brain and spine was performed due to BLE weakness and was negative. He was started on Zosyn for pneumonia but his leukocytosis worsened. He was the seed by Infectious Disease and started on Cefdinir, Acyclovir, and Ampicillin for empiric meningitis coverage. Patient was unable to tolerate a LP. He demonstrated clinical improvement with this treatment, his leukocytosis trended down, and his oxygenation status returned to baseline. ID recommended a 14 day course of abx at discharge. Patient was evaluated by PT and OT and comprehensive inpatient rehabilitation  was recommended. Patient's daughter, who is a NP, request admission to [facility]ing Bed for rehabilitation services.      Past Medical History:   Diagnosis Date    CHF (congestive heart failure)     COPD (chronic obstructive pulmonary disease)     Coronary artery disease        History reviewed. No pertinent surgical history.    Review of  patient's allergies indicates:  No Known Allergies    No current facility-administered medications on file prior to encounter.     Current Outpatient Medications on File Prior to Encounter   Medication Sig    clopidogreL (PLAVIX) 75 mg tablet Take 75 mg by mouth once daily.    diltiaZEM (TIAZAC) 240 MG Cs24 Take 240 mg by mouth once daily.    aspirin (ECOTRIN) 81 MG EC tablet Take 81 mg by mouth once daily.    fluticasone-salmeterol diskus inhaler 250-50 mcg Inhale 1 puff into the lungs 2 (two) times a day.    furosemide (LASIX) 20 MG tablet Take 20 mg by mouth 2 (two) times a day.    potassium chloride SA (K-DUR,KLOR-CON) 20 MEQ tablet Take 20 mEq by mouth once daily.    rOPINIRole (REQUIP) 0.5 MG tablet Take 0.5 mg by mouth 2 (two) times a day.    sertraline (ZOLOFT) 25 MG tablet Take 50 mg by mouth every evening.    simvastatin (ZOCOR) 40 MG tablet Take 40 mg by mouth every evening.    vitamin D (VITAMIN D3) 1000 units Tab Take 2,000 Units by mouth once daily.     Family History    None       Tobacco Use    Smoking status: Former     Packs/day: 2.00     Types: Cigarettes    Smokeless tobacco: Never   Substance and Sexual Activity    Alcohol use: Not Currently    Drug use: Never    Sexual activity: Not Currently     Review of Systems   Constitutional:  Negative for appetite change, chills and fever.   Respiratory:  Positive for cough and shortness of breath. Negative for wheezing.    Cardiovascular:  Negative for chest pain, palpitations and leg swelling.   Gastrointestinal:  Negative for abdominal distention, diarrhea, nausea and vomiting.   Genitourinary:  Negative for dysuria.   Musculoskeletal:  Positive for arthralgias and back pain.   Skin:  Negative for rash.   Neurological:  Positive for weakness. Negative for dizziness, seizures and syncope.   Psychiatric/Behavioral:  Negative for agitation, behavioral problems and confusion.    All other systems reviewed and are negative.  Objective:      Vital Signs (Most Recent):  Temp: 97.7 °F (36.5 °C) (05/05/23 0804)  Pulse: 83 (05/05/23 0804)  Resp: 20 (05/05/23 0804)  BP: 131/81 (05/05/23 0804)  SpO2: 96 % (05/05/23 0804) Vital Signs (24h Range):  Temp:  [97.7 °F (36.5 °C)-99 °F (37.2 °C)] 97.7 °F (36.5 °C)  Pulse:  [78-85] 83  Resp:  [20-24] 20  SpO2:  [94 %-97 %] 96 %  BP: (131-149)/(53-81) 131/81     Weight: 91.1 kg (200 lb 14.4 oz)  Body mass index is 32.43 kg/m².     Physical Exam  Vitals reviewed.   Constitutional:       General: He is not in acute distress.     Appearance: Normal appearance.   HENT:      Head: Normocephalic and atraumatic.   Eyes:      General: No scleral icterus.     Extraocular Movements: Extraocular movements intact.      Conjunctiva/sclera: Conjunctivae normal.      Pupils: Pupils are equal, round, and reactive to light.   Cardiovascular:      Rate and Rhythm: Normal rate and regular rhythm.      Heart sounds: No murmur heard.    No friction rub. No gallop.   Pulmonary:      Effort: Pulmonary effort is normal. No respiratory distress.      Breath sounds: No wheezing, rhonchi or rales.      Comments: Diminished bilaterally   Abdominal:      General: Abdomen is flat. Bowel sounds are normal. There is no distension.      Palpations: Abdomen is soft.      Tenderness: There is no abdominal tenderness. There is no guarding.   Musculoskeletal:         General: No swelling.      Right lower leg: No edema.      Left lower leg: No edema.   Skin:     General: Skin is warm and dry.      Coloration: Skin is not jaundiced.      Findings: No rash.   Neurological:      General: No focal deficit present.      Mental Status: He is alert and oriented to person, place, and time.      Sensory: No sensory deficit.      Motor: Weakness present.   Psychiatric:         Mood and Affect: Mood normal.         Behavior: Behavior normal.         Thought Content: Thought content normal.         Judgment: Judgment normal.            CRANIAL NERVES     CN III,  IV, VI   Pupils are equal, round, and reactive to light.     Significant Labs: All pertinent labs within the past 24 hours have been reviewed.  Recent Lab Results         05/05/23  0520        Anion Gap 5       Baso # 0.03       Basophil % 0.3       BUN 11       BUN/CREAT RATIO 17       Calcium 8.8       Chloride 100       CO2 41       Creatinine 0.64       Differential Type Auto       eGFR 96       Eos # 0.48       Eosinophil % 4.3       Glucose 142       Hematocrit 34.2       Hemoglobin 10.3       Lymph # 1.44       Lymph % 12.9       MCH 26.7       MCHC 30.1       MCV 88.6       Mono # 1.19       Mono % 10.7       MPV 8.7       Neutrophils, Abs 8.02       Neutrophils Relative 71.8       Platelets 323       Potassium 3.8       RBC 3.86       RDW 18.0       Sodium 142       WBC 11.16               Significant Imaging: I have reviewed all pertinent imaging results/findings within the past 24 hours.    Assessment/Plan:     HTN (hypertension)  Continue home meds.  Adjust as needed.       CAD (coronary artery disease)  Stable.       Chronic systolic heart failure  Continue meds, monitor weights.  Cardiac diet.       Chronic obstruct airways disease  Nebs, O2.  Monitor.         VTE Risk Mitigation (From admission, onward)         Ordered     IP VTE LOW RISK PATIENT  Once         05/04/23 1924                           Jean Pierre Chappell,   Department of Hospital Medicine  [facility]

## 2023-05-05 NOTE — PROGRESS NOTES
PT attempted eval, but pt requested PT return in the afternoon as he was about to get his lunch. One minute later, pt's lunch was delivered. Pt's son in room and showed PT new donut cushion he purchased for pt due to buttock pain. States pt has pressure ulcer. Pt/son informed that PT will try to come back around 3:00pm due to also having multiple outpt's scheduled this afternoon. They both verbalized understanding and voiced no complaints.

## 2023-05-05 NOTE — PLAN OF CARE
Problem: Fall Injury Risk  Goal: Absence of Fall and Fall-Related Injury  Outcome: Ongoing, Progressing     Problem: Fatigue  Goal: Improved Activity Tolerance  Outcome: Ongoing, Progressing

## 2023-05-05 NOTE — HPI
Mr. Welsh is a 83 y/o WM with a PMH of HTN, CHF, COPD, CAD s/p stent, and anxiety disorder who presents for admission to [facility]ing Bed services. He was previously seen at [facility] on 4/5/23 with increased SOB. He was found to have CAP and CHF and treated with Lasix with no improvement of symptoms. He was then transferred to [facility] at family request for higher level of care. CTA PE protocol was performed and negative for PE. He was also evaluated for worsening left leg weakness with onset 11/22 and new onset right leg weakness. A MRI brain and spine was performed due to BLE weakness and was negative. He was started on Zosyn for pneumonia but his leukocytosis worsened. He was the seed by Infectious Disease and started on Cefdinir, Acyclovir, and Ampicillin for empiric meningitis coverage. Patient was unable to tolerate a LP. He demonstrated clinical improvement with this treatment, his leukocytosis trended down, and his oxygenation status returned to baseline. ID recommended a 14 day course of abx at discharge. Patient was evaluated by PT and OT and comprehensive inpatient rehabilitation  was recommended. Patient's daughter, who is a NP, request admission to [facility] Bed for rehabilitation services.

## 2023-05-05 NOTE — PT/OT/SLP EVAL
SLP Screen    Date:5/5/23    SLP Screen completed this date. No skilled ST services warranted at this time. Reconsult ST upon change in status.     Sonya Maxwell M.S. [facility]-SLP

## 2023-05-05 NOTE — PROGRESS NOTES
[facility] Medicine  Progress Note    Patient Name: Shaji Welsh  MRN: [id number]  Patient Class: IP- Swing   Admission Date: 5/4/2023  Length of Stay: 1 days  Attending Physician: Jean Pierre Chappell DO  Primary Care Provider: LIZETTE Taylor        Subjective:     Principal Problem:Physical deconditioning        HPI:  Mr. Welsh is a 83 y/o WM with a PMH of HTN, CHF, COPD, CAD s/p stent, and anxiety disorder who presents for admission to [facility]ing [facility] services. He was previously seen at [facility] on 4/5/23 with increased SOB. He was found to have CAP and CHF and treated with Lasix with no improvement of symptoms. He was then transferred to [facility] at family request for higher level of care. CTA PE protocol was performed and negative for PE. He was also evaluated for worsening left leg weakness with onset 11/22 and new onset right leg weakness. A MRI brain and spine was performed due to BLE weakness and was negative. He was started on Zosyn for pneumonia but his leukocytosis worsened. He was the seed by Infectious Disease and started on Cefdinir, Acyclovir, and Ampicillin for empiric meningitis coverage. Patient was unable to tolerate a LP. He demonstrated clinical improvement with this treatment, his leukocytosis trended down, and his oxygenation status returned to baseline. ID recommended a 14 day course of abx at discharge. Patient was evaluated by PT and OT and comprehensive inpatient rehabilitation  was recommended. Patient's daughter, who is a NP, request admission to [facility] for rehabilitation services.      Overview/Hospital Course:  5/4 Patient is sitting up in Luciano chair watching TV in [facility]. Son is at bedside. He is pleasant and denies any discomfort or sob at present. 1+ edema noted BLE. Patient encouraged to elevate lower extremities as much as possible. Patient appears upbeat about PT/OT and expresses hope it will strengthen his lower  extremities and increase his mobility and independence.      Review of Systems   Constitutional:  Negative for appetite change, chills and fever.   Respiratory:  Negative for apnea, cough, choking, chest tightness, shortness of breath, wheezing and stridor.    Cardiovascular:  Negative for chest pain, palpitations and leg swelling.   Gastrointestinal:  Negative for abdominal distention, abdominal pain, constipation, diarrhea and nausea.   Genitourinary:  Negative for dysuria and frequency.   All other systems reviewed and are negative.  Objective:     Vital Signs (Most Recent):  Temp: 99 °F (37.2 °C) (05/04/23 1840)  Pulse: 81 (05/05/23 0012)  Resp: 20 (05/05/23 0012)  BP: (!) 149/53 (05/04/23 2033)  SpO2: 97 % (05/05/23 0012)   Vital Signs (24h Range):  Temp:  [99 °F (37.2 °C)] 99 °F (37.2 °C)  Pulse:  [78-85] 81  Resp:  [20-24] 20  SpO2:  [94 %-97 %] 97 %  BP: (149)/(53) 149/53     Weight: 91.7 kg (202 lb 1.6 oz) (the weight in pounds was 202.1 i think the computer added wrong.)  Body mass index is 32.62 kg/m².    Intake/Output Summary (Last 24 hours) at 5/5/2023 0029  Last data filed at 5/4/2023 2234  Gross per 24 hour   Intake 120 ml   Output --   Net 120 ml         Physical Exam  Vitals and nursing note reviewed.   Constitutional:       General: He is not in acute distress.     Appearance: Normal appearance. He is obese. He is not ill-appearing, toxic-appearing or diaphoretic.   HENT:      Head: Normocephalic.      Mouth/Throat:      Mouth: Mucous membranes are moist.   Eyes:      General: No scleral icterus.     Extraocular Movements: Extraocular movements intact.      Conjunctiva/sclera: Conjunctivae normal.   Cardiovascular:      Rate and Rhythm: Normal rate and regular rhythm.      Pulses: Normal pulses.      Heart sounds: Normal heart sounds. No murmur heard.    No friction rub. No gallop.   Pulmonary:      Effort: Pulmonary effort is normal. No respiratory distress.      Breath sounds: Normal breath  sounds. No stridor. No wheezing, rhonchi or rales.   Musculoskeletal:      Cervical back: Normal range of motion and neck supple. No rigidity or tenderness.      Right lower leg: Edema present.      Left lower leg: Edema present.   Skin:     General: Skin is warm and dry.      Capillary Refill: Capillary refill takes 2 to 3 seconds.   Neurological:      General: No focal deficit present.      Mental Status: He is alert. He is disoriented.   Psychiatric:         Mood and Affect: Mood normal.         Behavior: Behavior normal.         Thought Content: Thought content normal.         Judgment: Judgment normal.           Significant Labs: All pertinent labs within the past 24 hours have been reviewed.  BMP: No results for input(s): GLU, NA, K, CL, CO2, BUN, CREATININE, CALCIUM, MG in the last 48 hours.  CBC: No results for input(s): WBC, HGB, HCT, PLT in the last 48 hours.    Significant Imaging: I have reviewed all pertinent imaging results/findings within the past 24 hours.      Assessment/Plan:      No notes have been filed under this hospital service.  Service: Hospital Medicine    VTE Risk Mitigation (From admission, onward)         Ordered     IP VTE LOW RISK PATIENT  Once         05/04/23 1924                Discharge Planning   MARIZA:      Code Status: Full Code   Is the patient medically ready for discharge?: No    Reason for patient still in hospital (select all that apply): Patient trending condition, Laboratory test, Treatment and PT / OT recommendations                     LIZETTE Brice  Department of Hospital Medicine   [facility]

## 2023-05-05 NOTE — PT/OT/SLP PROGRESS
"Occupational Therapy   Treatment    Name: Shaji Welsh  MRN: [id number]  Admitting Diagnosis:  Physical deconditioning       Recommendations:     Discharge Recommendations: home health PT, home health OT, home with home health  Discharge Equipment Recommendations:  3-in-1 commode, grab bar, hip kit, oxygen, walker, rolling, shower chair  Barriers to discharge:  Other (Comment) (pt reports his wife is unable to help him physically at home due to her own health problems, so he needs to be in "top" shape to go home and "do for himself.")    Assessment:     Shaji Welsh is a 82 y.o. male with a medical diagnosis of Physical deconditioning.  He presents with pleasant mood and agreeable to therapy, wanting "to walk better, as good as I can!" Performance deficits affecting function are weakness, impaired endurance, impaired self care skills, impaired functional mobility, gait instability, impaired balance, impaired cardiopulmonary response to activity.     Rehab Prognosis:  Good; patient would benefit from acute skilled OT services to address these deficits and reach maximum level of function.       Plan:     Patient to be seen 5 x/week to address the above listed problems via self-care/home management, community/work re-entry, therapeutic exercises, therapeutic activities, wheelchair management/training  Plan of Care Expires: 05/26/23  Plan of Care Reviewed with: patient, caregiver, son    Subjective     Chief Complaint: continued weakness in LB and poor walking ability  Patient/Family Comments/goals: return home with mod I and use of AE as needed for "doing for himself" due to his wife not being able to physically help him at home get up and down  Pain/Comfort:  Pain Rating 1: 0/10    Objective:     Communicated with: pt and son and nursing prior to session.  Patient found up in chair with oxygen (2L) upon OT entry to room.  Nursing reports pt is pleasant, agreeable.     General Precautions: Standard, fall, respiratory  "   Orthopedic Precautions:Full weight bearing  Braces: N/A  Respiratory Status: Nasal cannula, flow 2 L/min     Occupational Performance:     Bed Mobility:    Na with OT this morning, will test later    Functional Mobility/Transfers:  Patient completed Sit <> Stand Transfer with moderate assistance  with  rolling walker   Patient completed Bed <> Chair Transfer using Step Transfer technique with minimum assistance with rolling walker  Functional Mobility: walks across room a few steps with RW, slow speed, no LOB, min cueing needed for handling oxygen tubing safely    Activities of Daily Living:  Feeding:  independence bedside sitting up EOB or from chair  Grooming: independence at sinkside with setup seated  Bathing: moderate assistance mainly for LB assist  Upper Body Dressing: minimum assistance seated EOB  Lower Body Dressing: moderate assistance overall  Toileting: moderate assistance for managing clothing and standing/transfer      [facility] 6 Click ADL: 17    Treatment & Education:  Eval completed; pt family member (son) present and OT provided education to family and pt for progression of therapy between facilities, pt expected d/c planning needs and time frame, as well as what to expect from therapy while pt is here progressing toward higher level of independence.  Discussed also AE needs and usage with ADL with CNA who came in while OT finishing up evaluation.  Pt already has a LH sponge, so CNA will encourage use of this during pt self care routine, per OT directives according to pt current ability and skill.  Pt also agreeable and both he and his son with good understanding of his need to perform as much self care as he can for himself in order to promote progression with independence of all self care and mobility skills.  OT also informed pt of his current need to call for assist upon any standing to avoid falls until he progresses to more independent level.  They relate good understanding.    Pt son  "asking about "waffle cushion."  Per OT knowledge, facility does not have waffle cushions on hand at present, but OT offered to son that our facility can request order of cushion to promote good skin integrity for pt while up in w/c if pillow in chair is insufficient at this time.  Son asked if there was somewhere he could buy one, so OT provided information regarding the DME store next door, and son was happy and wanted to go get cushion presently, which he did indeed leave facility to go and buy.    Upon OT finishing evaluation with pt, OT notified nursing and discussed cushion needs with them.  Nursing informed OT that there are "waffle cushions" available in facility (son had already left).  They indicate son had asked for "egg crate" cushion earlier, but this type was not available.  Will check with son later to see if he found what he wanted for his dad's w/c.    Pt will need ADL equipment besides LH sponge that he already has for LB self care ability.  Will plan to distribute next week to pt and instruct/demonstrate use.    Patient left up in chair with call button in reach and all needs within reach including cell phone and remote control for tv and water cups.    GOALS:   Multidisciplinary Problems       Occupational Therapy Goals          Problem: Occupational Therapy    Goal Priority Disciplines Outcome Interventions   Occupational Therapy Goal     OT, PT/OT Ongoing, Progressing    Description: STG: within 2 weeks  Pt will perform grooming with setup and independence at sinkside from sitting or standing  Pt will bathe with SBA with spongebathing at sinkside  Pt will perform UE dressing with setup and independence  Pt will perform LE dressing with SBA with AE   Pt will sit EOB x 30 min with no assistance  Pt will transfer bed/chair/bsc with mod i  Pt will perform standing task x 5 min with svn assistance  Pt will tolerate 45 minutes of tx without fatigue      LT.Restore to max I with self care and " mobility.                          Time Tracking:     OT Date of Treatment: 05/05/23  OT Start Time: 0915  OT Stop Time: 0947  OT Total Time (min): 32 min    Billable Minutes:Evaluation 15  Therapeutic Activity 17    OT/ROSALINA: OT          5/5/2023

## 2023-05-05 NOTE — PT/OT/SLP EVAL
"Occupational Therapy   Evaluation    Name: Shaji Welsh  MRN: [id number]  Admitting Diagnosis: Physical deconditioning  Recent Surgery: * No surgery found *        Recommendations:     Discharge Recommendations: home health PT, home health OT, home with home health  Discharge Equipment Recommendations:  3-in-1 commode, grab bar, hip kit, oxygen, walker, rolling, shower chair  Barriers to discharge:  Other (Comment) (pt reports his wife is unable to help him physically at home due to her own health problems, so he needs to be in "top" shape to go home and "do for himself.")    Assessment:     Shaji Welsh is a 82 y.o. male with a medical diagnosis of Physical deconditioning.  He presents with pleasant mood and agreeable to therapy, wanting "to walk better, as good as I can!" Performance deficits affecting function are weakness, impaired endurance, impaired self care skills, impaired functional mobility, gait instability, impaired balance, impaired cardiopulmonary response to activity.     Rehab Prognosis:  Good; patient would benefit from acute skilled OT services to address these deficits and reach maximum level of function.         Subjective     Chief Complaint: continued weakness in LB and poor walking ability  Patient/Family Comments/goals: return home with mod I and use of AE as needed for "doing for himself" due to his wife not being able to physically help him at home get up and down  Pain/Comfort:  Pain Rating 1: 0/10    Objective:     Communicated with: pt and son and nursing prior to session.  Patient found up in chair with oxygen (2L) upon OT entry to room.  Nursing reports pt is pleasant, agreeable.     General Precautions: Standard, fall, respiratory    Orthopedic Precautions:Full weight bearing  Braces: N/A  Respiratory Status: Nasal cannula, flow 2 L/min     Occupational Performance:     Bed Mobility:    Na with OT this morning, will test later    Functional Mobility/Transfers:  Patient completed Sit " "<> Stand Transfer with moderate assistance  with  rolling walker   Patient completed Bed <> Chair Transfer using Step Transfer technique with minimum assistance with rolling walker  Functional Mobility: walks across room a few steps with RW, slow speed, no LOB, min cueing needed for handling oxygen tubing safely    Activities of Daily Living:  Feeding:  independence bedside sitting up EOB or from chair  Grooming: independence at sinkside with setup seated  Bathing: moderate assistance mainly for LB assist  Upper Body Dressing: minimum assistance seated EOB  Lower Body Dressing: moderate assistance overall  Toileting: moderate assistance for managing clothing and standing/transfer      [facility] 6 Click ADL: 17    Treatment & Education:  Eval completed; pt family member (son) present and OT provided education to family and pt for progression of therapy between facilities, pt expected d/c planning needs and time frame, as well as what to expect from therapy while pt is here progressing toward higher level of independence.  Discussed also AE needs and usage with ADL with CNA who came in while OT finishing up evaluation.  Pt already has a LH sponge, so CNA will encourage use of this during pt self care routine, per OT directives according to pt current ability and skill.  Pt also agreeable and both he and his son with good understanding of his need to perform as much self care as he can for himself in order to promote progression with independence of all self care and mobility skills.  OT also informed pt of his current need to call for assist upon any standing to avoid falls until he progresses to more independent level.  They relate good understanding.    Pt son asking about "waffle cushion."  Per OT knowledge, facility does not have waffle cushions on hand at present, but OT offered to son that our facility can request order of cushion to promote good skin integrity for pt while up in w/c if pillow in chair is " "insufficient at this time.  Son asked if there was somewhere he could buy one, so OT provided information regarding the DME store next door, and son was happy and wanted to go get cushion presently, which he did indeed leave facility to go and buy.    Upon OT finishing evaluation with pt, OT notified nursing and discussed cushion needs with them.  Nursing informed OT that there are "waffle cushions" available in facility (son had already left).  They indicate son had asked for "egg crate" cushion earlier, but this type was not available.  Will check with son later to see if he found what he wanted for his dad's w/c.    Pt will need ADL equipment besides LH sponge that he already has for LB self care ability.  Will plan to distribute next week to pt and instruct/demonstrate use.    Patient left up in chair with call button in reach and all needs within reach including cell phone and remote control for tv and water cups.    Plan:     Patient to be seen 5 x/week to address the above listed problems via self-care/home management, community/work re-entry, therapeutic exercises, therapeutic activities, wheelchair management/training  Plan of Care Expires: 05/26/23  Plan of Care Reviewed with: patient, caregiver, son        Occupational Profile:  Living Environment: home with wife, see PT for into house needs  Previous level of function: mod I at home with wife, RW  Roles and Routines: retired,   Equipment Used at Home: walker, rolling  Assistance upon Discharge: mod I with RW, use of BSC and shower equipment adapted as needed    Pain/Comfort:  Pain Rating 1: 0/10    Patients cultural, spiritual, Mormonism conflicts given the current situation: no    Objective:     Communicated with: pt, son, nursing prior to session.   General Precautions: Standard, fall, respiratory  Orthopedic Precautions: Full weight bearing  Braces: N/A      Occupational Performance:    Cognitive/Visual Perceptual:  Cognitive/Psychosocial " Skills:     -       Oriented to: Person, Place, Time, and Situation   -       Follows Commands/attention:Follows two-step commands  -       Communication: clear/fluent  -       Memory: No Deficits noted and on eval pt very pleasant and aware of short term and long term memory overall  -       Safety awareness/insight to disability: intact and appears to understand need to call for assist with any standing   -       Mood/Affect/Coping skills/emotional control: Appropriate to situation, Cooperative, and Pleasant    Physical Exam:  Balance:    -       fair standing dynamic and static, good sitting overall  Dominant hand:    -       right  Upper Extremity Range of Motion:     -       Right Upper Extremity: 75% shoulder AROM  -       Left Upper Extremity: 50% shoulder AROM  Upper Extremity Strength:    -       Right Upper Extremity: 3+/5  -       Left Upper Extremity: -3/5   Strength:    -       Right Upper Extremity: 3+5  -       Left Upper Extremity: -3/5  Fine Motor Coordination:    -       Intact  Gross motor coordination:   WFL    AMPAC 6 Click ADL:  AMPAC Total Score: 17    Treatment & Education:  Eval and theract (education with pt and family and nursing as noted in another part of this note.)    Patient left up in chair with call button in reach    GOALS:   Multidisciplinary Problems       Occupational Therapy Goals          Problem: Occupational Therapy    Goal Priority Disciplines Outcome Interventions   Occupational Therapy Goal     OT, PT/OT Ongoing, Progressing    Description: STG: within 2 weeks  Pt will perform grooming with setup and independence at sinkside from sitting or standing  Pt will bathe with SBA with spongebathing at sinkside  Pt will perform UE dressing with setup and independence  Pt will perform LE dressing with SBA with AE   Pt will sit EOB x 30 min with no assistance  Pt will transfer bed/chair/bsc with mod i  Pt will perform standing task x 5 min with svn assistance  Pt will tolerate  45 minutes of tx without fatigue      LT.Restore to max I with self care and mobility.                          History:     Past Medical History:   Diagnosis Date    Pressure injury of buttock, stage 2        History reviewed. No pertinent surgical history.    Time Tracking:     OT Date of Treatment: 23  OT Start Time: 915  OT Stop Time: 947  OT Total Time (min): 32 min    Billable Minutes:Evaluation 15  Therapeutic Activity 17    2023

## 2023-05-05 NOTE — PLAN OF CARE
"  Problem: Physical Therapy  Goal: Physical Therapy Goal  Description: STG - 2 weeks  1. Pt will be CGA with transfers.  2. Pt will be CGA with bed mob.  3. Pt will be min A/CGA to amb 75 ft using RW.  4. Pt will have ROM seth ankle DF WFL.    LTG - 4 weeks  1. Pt will be SBA with transfers.  2. Pt will be SBA with bed mob.  3. Pt will be SBA to amb 150 ft using RW.  4. Pt will gain 1/2 muscle grade strength BLE.  5. Pt's LBP will be abolished.  6. Pt will negotiate 4" step with SBA using RW.  Outcome: Ongoing, Progressing     "

## 2023-05-05 NOTE — SUBJECTIVE & OBJECTIVE
Review of Systems   Constitutional:  Negative for appetite change, chills and fever.   Respiratory:  Negative for apnea, cough, choking, chest tightness, shortness of breath, wheezing and stridor.    Cardiovascular:  Negative for chest pain, palpitations and leg swelling.   Gastrointestinal:  Negative for abdominal distention, abdominal pain, constipation, diarrhea and nausea.   Genitourinary:  Negative for dysuria and frequency.   All other systems reviewed and are negative.  Objective:     Vital Signs (Most Recent):  Temp: 99 °F (37.2 °C) (05/04/23 1840)  Pulse: 81 (05/05/23 0012)  Resp: 20 (05/05/23 0012)  BP: (!) 149/53 (05/04/23 2033)  SpO2: 97 % (05/05/23 0012)   Vital Signs (24h Range):  Temp:  [99 °F (37.2 °C)] 99 °F (37.2 °C)  Pulse:  [78-85] 81  Resp:  [20-24] 20  SpO2:  [94 %-97 %] 97 %  BP: (149)/(53) 149/53     Weight: 91.7 kg (202 lb 1.6 oz) (the weight in pounds was 202.1 i think the computer added wrong.)  Body mass index is 32.62 kg/m².    Intake/Output Summary (Last 24 hours) at 5/5/2023 0029  Last data filed at 5/4/2023 2234  Gross per 24 hour   Intake 120 ml   Output --   Net 120 ml         Physical Exam  Vitals and nursing note reviewed.   Constitutional:       General: He is not in acute distress.     Appearance: Normal appearance. He is obese. He is not ill-appearing, toxic-appearing or diaphoretic.   HENT:      Head: Normocephalic.      Mouth/Throat:      Mouth: Mucous membranes are moist.   Eyes:      General: No scleral icterus.     Extraocular Movements: Extraocular movements intact.      Conjunctiva/sclera: Conjunctivae normal.   Cardiovascular:      Rate and Rhythm: Normal rate and regular rhythm.      Pulses: Normal pulses.      Heart sounds: Normal heart sounds. No murmur heard.    No friction rub. No gallop.   Pulmonary:      Effort: Pulmonary effort is normal. No respiratory distress.      Breath sounds: Normal breath sounds. No stridor. No wheezing, rhonchi or rales.    Musculoskeletal:      Cervical back: Normal range of motion and neck supple. No rigidity or tenderness.      Right lower leg: Edema present.      Left lower leg: Edema present.   Skin:     General: Skin is warm and dry.      Capillary Refill: Capillary refill takes 2 to 3 seconds.   Neurological:      General: No focal deficit present.      Mental Status: He is alert. He is disoriented.   Psychiatric:         Mood and Affect: Mood normal.         Behavior: Behavior normal.         Thought Content: Thought content normal.         Judgment: Judgment normal.           Significant Labs: All pertinent labs within the past 24 hours have been reviewed.  BMP: No results for input(s): GLU, NA, K, CL, CO2, BUN, CREATININE, CALCIUM, MG in the last 48 hours.  CBC: No results for input(s): WBC, HGB, HCT, PLT in the last 48 hours.    Significant Imaging: I have reviewed all pertinent imaging results/findings within the past 24 hours.

## 2023-05-06 NOTE — PLAN OF CARE
Problem: Adult Inpatient Plan of Care  Goal: Plan of Care Review  Outcome: Ongoing, Progressing  Goal: Patient-Specific Goal (Individualized)  Outcome: Ongoing, Progressing  Goal: Absence of Hospital-Acquired Illness or Injury  Outcome: Ongoing, Progressing  Goal: Optimal Comfort and Wellbeing  Outcome: Ongoing, Progressing  Goal: Readiness for Transition of Care  Outcome: Ongoing, Progressing     Problem: Fall Injury Risk  Goal: Absence of Fall and Fall-Related Injury  Outcome: Ongoing, Progressing     Problem: Fatigue  Goal: Improved Activity Tolerance  Outcome: Ongoing, Progressing     Problem: Skin Injury Risk Increased  Goal: Skin Health and Integrity  Outcome: Ongoing, Progressing

## 2023-05-07 NOTE — NURSING
Nurses Note -- 4 Eyes      5/4/2023   6:40 PM      Skin assessed during: Admit      [] No Altered Skin Integrity Present    [x]Prevention Measures Documented      [x] Yes- Altered Skin Integrity Present or Discovered   [x] LDA Added if Not in Epic (Describe Wound)   [x] New Altered Skin Integrity was Present on Admit and Documented in LDA   [x] Wound Image Taken    Wound Care Consulted? No    Attending Nurse:  Kamini Calderon RN     Second RN/Staff Member:  Dayana Buchanan RN

## 2023-05-07 NOTE — RESPIRATORY THERAPY
Pt was eating dinner with family and friends and requested to have his treatment later with the 2000 treatment. No distress to note at this time will continue to monitor.

## 2023-05-08 NOTE — PLAN OF CARE
Problem: Breathing Pattern Ineffective  Goal: Effective Breathing Pattern  Outcome: Ongoing, Progressing     Problem: Gas Exchange Impaired  Goal: Optimal Gas Exchange  Outcome: Ongoing, Progressing     Problem: Adjustment to Illness COPD (Chronic Obstructive Pulmonary Disease)  Goal: Optimal Chronic Illness Coping  Outcome: Ongoing, Progressing     Problem: Functional Ability Impaired COPD (Chronic Obstructive Pulmonary Disease)  Goal: Optimal Level of Functional [place]  Outcome: Ongoing, Progressing     Problem: Infection COPD (Chronic Obstructive Pulmonary Disease)  Goal: Absence of Infection Signs and Symptoms  Outcome: Ongoing, Progressing     Problem: Oral Intake Inadequate COPD (Chronic Obstructive Pulmonary Disease)  Goal: Improved Nutrition Intake  Outcome: Ongoing, Progressing     Problem: Respiratory Compromise COPD (Chronic Obstructive Pulmonary Disease)  Goal: Effective Oxygenation and Ventilation  Outcome: Ongoing, Progressing

## 2023-05-08 NOTE — PROGRESS NOTES
Clinical Pharmacy Chart Review Note      Admit Date: 5/4/2023   LOS: 4 days       Shaji Welsh is a 82 y.o. male admitted to SNF for PT/OT after hospitalization for acute respiratory failure.    Active Hospital Problems    Diagnosis  POA    *Physical deconditioning [R53.81]  Yes    Weakness of both lower extremities [R29.898]  Yes    Chronic obstruct airways disease [J44.9]  Yes    Chronic systolic heart failure [I50.22]  Yes    CAD (coronary artery disease) [I25.10]  Yes    HTN (hypertension) [I10]  Yes      Resolved Hospital Problems   No resolved problems to display.     Review of patient's allergies indicates:  No Known Allergies  Patient Active Problem List    Diagnosis Date Noted    Physical deconditioning 05/05/2023    Weakness of both lower extremities 05/05/2023    Chronic obstruct airways disease 05/05/2023    Chronic systolic heart failure 05/05/2023    CAD (coronary artery disease) 05/05/2023    HTN (hypertension) 05/05/2023       Scheduled Meds:    aspirin  81 mg Oral Daily    atorvastatin  20 mg Oral QHS    calcium-vitamin D3  1 tablet Oral Daily    clopidogreL  75 mg Oral Daily    diltiaZEM  240 mg Oral Daily    fluticasone-salmeterol 250-50 mcg/dose  1 puff Inhalation BID    furosemide  40 mg Oral Daily    hydrOXYzine pamoate  50 mg Oral QHS    potassium chloride SA  20 mEq Oral Daily    rOPINIRole  0.5 mg Oral BID    sertraline  50 mg Oral QHS    zinc oxide   Topical (Top) Daily     Continuous Infusions:   PRN Meds: acetaminophen, calcium carbonate, melatonin, senna-docusate 8.6-50 mg, traMADoL    OBJECTIVE:     Vital Signs (Last 24H)  Temp:  [97.6 °F (36.4 °C)-98.1 °F (36.7 °C)]   Pulse:  [70-84]   Resp:  [16-23]   BP: (137-143)/(56-68)   SpO2:  [94 %-100 %]     Laboratory:  CBC:   Recent Labs   Lab 05/05/23  0520   WBC 11.16*   RBC 3.86*   HGB 10.3*   HCT 34.2*      MCV 88.6   MCH 26.7*   MCHC 30.1*     BMP:   Recent Labs   Lab 05/05/23 0520   *      K 3.8      CO2 41*    BUN 11   CREATININE 0.64*   CALCIUM 8.8         ASSESSMENT/PLAN:     Estimated Creatinine Clearance: 95.8 mL/min (A) (based on SCr of 0.64 mg/dL (L)).  Medications reviewed, no dose adjustments needed.Weekly swingbed medication regimen completed.

## 2023-05-08 NOTE — PT/OT/SLP PROGRESS
Occupational Therapy  Treatment    Shaji Welsh   MRN: [id number]   Admitting Diagnosis: Physical deconditioning    OT Date of Treatment: 05/08/23   OT Start Time: 1035  OT Stop Time: 1100  OT Total Time (min): 25 min    Billable Minutes:  Therapeutic Exercise 25    OT/ROSALINA: OT          General Precautions: Standard, fall, respiratory  Orthopedic Precautions: Full weight bearing  Braces: N/A  Respiratory Status: Nasal cannula, flow 2 L/min         Subjective:  Communicated with pt and his wife who was at bedside prior to session.  No new complaints voiced       Objective:        Functional Mobility:  Bed Mobility:  na with OT today       Transfers: Pt did not attempt with OT, he was already in w/c upon OT arrival        Functional Ambulation: na with OT today    Activities of Daily Living:     Feeding adaptive equipment:  none     UE adaptive equipment: none     LE adaptive equipment: none at this time                    Bathing adaptive equipment: none at this time    Balance:   Static Sit: FAIR+: Able to take MINIMAL challenges from all directions  Dynamic Sit: FAIR+: Maintains balance through MINIMAL excursions of active trunk motion  Static Stand: na with OT today  Dynamic stand: na with OT today    Therapeutic Activities and Exercises:  OT engaged pt in UBE x 8 min with 2 to 3 RB's throughout at low level resist for endurance training to impact overall self care and functional mobility.  Then, OT setup pt with reciprocal pulleys to impact his AROM and overhead reach ability.  Pt wearing 2L supp ox nasal cannula throughout, but he appeared winded and fatigued, needing frequent RB's.  After this, OT facilitated pt strength training for pushing up from sufaces by using red tubing and instructing pt with lat pulls and tricep presses x 15 reps each.  At this point, pt was fatiguing and needing RB, so CNA came to get him and take him back to his room for resting and awaiting PT session.    AM-PAC 6 CLICK ADL   How much  "help from another person does this patient currently need?   1 = Unable, Total/Dependent Assistance  2 = A lot, Maximum/Moderate Assistance  3 = A little, Minimum/Contact Guard/Supervision  4 = None, Modified [place]/Independent    Putting on and taking off regular lower body clothing? : 2  Bathing (including washing, rinsing, drying)?: 2  Toileting, which includes using toilet, bedpan, or urinal? : 2  Putting on and taking off regular upper body clothing?: 3  Taking care of personal grooming such as brushing teeth?: 4  Eating meals?: 4  Daily Activity Total Score: 17     AM-PAC Raw Score CMS "G-Code Modifier Level of Impairment Assistance   6 % Total / Unable   7 - 8 CM 80 - 100% Maximal Assist   9-13 CL 60 - 80% Moderate Assist   14 - 19 CK 40 - 60% Moderate Assist   20 - 22 CJ 20 - 40% Minimal Assist   23 CI 1-20% SBA / CGA   24 CH 0% Independent/ Mod I       Patient left up in chair with  CNA present    ASSESSMENT:  Shaji Welsh is a 82 y.o. male with a medical diagnosis of Physical deconditioning and presents with no new px's noted per pt and family.    Rehab identified problem list/impairments:  weakness, impaired endurance, impaired self care skills, impaired functional mobility, gait instability, impaired balance, impaired cardiopulmonary response to activity    Rehab potential is good.    Activity tolerance: Good    Discharge recommendations: home health PT, home health OT, home with home health   Barriers to discharge: Barriers to Discharge: Other (Comment) (pt reports his wife is unable to help him physically at home due to her own health problems, so he needs to be in "top" shape to go home and "do for himself.")    Equipment recommendations: 3-in-1 commode, grab bar, hip kit, oxygen, walker, rolling, shower chair    GOALS:   Multidisciplinary Problems       Occupational Therapy Goals          Problem: Occupational Therapy    Goal Priority Disciplines Outcome Interventions   Occupational " Therapy Goal     OT, PT/OT Ongoing, Progressing    Description: STG: within 2 weeks  Pt will perform grooming with setup and independence at sinkside from sitting or standing ONGOING/PROGRESSING  Pt will bathe with SBA with spongebathing at sinkside ONGOING/PROGRESSING  Pt will perform UE dressing with setup and independence ONGOING/PROGRESSING  Pt will perform LE dressing with SBA with AE ONGOING/PROGRESSING  Pt will sit EOB x 30 min with no assistance ONGOING/PROGRESSING  Pt will transfer bed/chair/bsc with mod I ONGOING/PROGRESSING  Pt will perform standing task x 5 min with svn assistance ONGOING/PROGRESSING  Pt will tolerate 45 minutes of tx without fatigue ONGOING/PROGRESSING      LT.Restore to max I with self care and mobility.                          Plan:  Patient to be seen 5 x/week to address the above listed problems via self-care/home management, community/work re-entry, therapeutic exercises, therapeutic activities, wheelchair management/training  Plan of Care expires: 23  Plan of Care reviewed with: patient, caregiver, son         2023

## 2023-05-08 NOTE — PROGRESS NOTES
[facility] Medicine  Progress Note    Patient Name: Shaji Welsh  MRN: [id number]  Patient Class: IP- Swing   Admission Date: 5/4/2023  Length of Stay: 4 days  Attending Physician: Jean Pierre Chappell DO  Primary Care Provider: LIZETTE Taylor        Subjective:     Principal Problem:Physical deconditioning        HPI:  Mr. Welsh is a 81 y/o WM with a PMH of HTN, CHF, COPD, CAD s/p stent, and anxiety disorder who presents for admission to [facility] services. He was previously seen at [facility] on 4/5/23 with increased SOB. He was found to have CAP and CHF and treated with Lasix with no improvement of symptoms. He was then transferred to [facility] at family request for higher level of care. CTA PE protocol was performed and negative for PE. He was also evaluated for worsening left leg weakness with onset 11/22 and new onset right leg weakness. A MRI brain and spine was performed due to BLE weakness and was negative. He was started on Zosyn for pneumonia but his leukocytosis worsened. He was the seed by Infectious Disease and started on Cefdinir, Acyclovir, and Ampicillin for empiric meningitis coverage. Patient was unable to tolerate a LP. He demonstrated clinical improvement with this treatment, his leukocytosis trended down, and his oxygenation status returned to baseline. ID recommended a 14 day course of abx at discharge. Patient was evaluated by PT and OT and comprehensive inpatient rehabilitation  was recommended. Patient's daughter, who is a NP, request admission to [facility] for rehabilitation services.      Overview/Hospital Course:  5/4 Patient is sitting up in Luciano chair watching TV in [facility]. Son is at bedside. He is pleasant and denies any discomfort or sob at present. 1+ edema noted BLE. Patient encouraged to elevate lower extremities as much as possible. Patient appears upbeat about PT/OT and expresses hope it will strengthen his lower  extremities and increase his mobility and independence.    5/8 Seen today, no major issues.  Went to therapy.  Sitting in WC today.  No complaints.       Interval History: seems to be doing well, no complaints today.    Review of Systems   Respiratory:  Negative for shortness of breath.    Cardiovascular:  Negative for chest pain.   Gastrointestinal:  Negative for abdominal pain, nausea and vomiting.   Psychiatric/Behavioral:  Negative for agitation and confusion.    All other systems reviewed and are negative.  Objective:     Vital Signs (Most Recent):  Temp: 97.6 °F (36.4 °C) (05/08/23 0803)  Pulse: 70 (05/08/23 0851)  Resp: 20 (05/08/23 0851)  BP: (!) 143/68 (05/08/23 0803)  SpO2: 100 % (05/08/23 0851) Vital Signs (24h Range):  Temp:  [97.6 °F (36.4 °C)-98.1 °F (36.7 °C)] 97.6 °F (36.4 °C)  Pulse:  [70-84] 70  Resp:  [16-23] 20  SpO2:  [94 %-100 %] 100 %  BP: (137-143)/(56-68) 143/68     Weight: 88.3 kg (194 lb 9.6 oz)  Body mass index is 31.41 kg/m².    Intake/Output Summary (Last 24 hours) at 5/8/2023 1156  Last data filed at 5/8/2023 1025  Gross per 24 hour   Intake 1080 ml   Output 1350 ml   Net -270 ml         Physical Exam  Vitals reviewed.   Constitutional:       General: He is not in acute distress.     Appearance: Normal appearance.   HENT:      Head: Normocephalic and atraumatic.   Eyes:      General: No scleral icterus.     Extraocular Movements: Extraocular movements intact.      Conjunctiva/sclera: Conjunctivae normal.      Pupils: Pupils are equal, round, and reactive to light.   Cardiovascular:      Rate and Rhythm: Normal rate and regular rhythm.      Heart sounds: No murmur heard.    No friction rub. No gallop.   Pulmonary:      Effort: Pulmonary effort is normal. No respiratory distress.      Breath sounds: No wheezing, rhonchi or rales.      Comments: Diminished bilaterally   Abdominal:      General: Abdomen is flat. Bowel sounds are normal. There is no distension.      Palpations: Abdomen is  soft.      Tenderness: There is no abdominal tenderness. There is no guarding.   Musculoskeletal:         General: No swelling.      Right lower leg: No edema.      Left lower leg: No edema.   Skin:     General: Skin is warm and dry.      Coloration: Skin is not jaundiced.      Findings: No rash.   Neurological:      General: No focal deficit present.      Mental Status: He is alert and oriented to person, place, and time.      Sensory: No sensory deficit.      Motor: Weakness present.   Psychiatric:         Mood and Affect: Mood normal.         Behavior: Behavior normal.         Thought Content: Thought content normal.         Judgment: Judgment normal.           Significant Labs: All pertinent labs within the past 24 hours have been reviewed.  Recent Lab Results       None            Significant Imaging: I have reviewed all pertinent imaging results/findings within the past 24 hours.      Assessment/Plan:      HTN (hypertension)  Continue home meds.  Adjust as needed.       CAD (coronary artery disease)  Stable.       Chronic systolic heart failure  Continue meds, monitor weights.  Cardiac diet.       Chronic obstruct airways disease  Nebs, O2.  Monitor.         VTE Risk Mitigation (From admission, onward)         Ordered     IP VTE LOW RISK PATIENT  Once         05/04/23 1924                Discharge Planning   MARIZA:      Code Status: Full Code   Is the patient medically ready for discharge?: No    Reason for patient still in hospital (select all that apply): PT / OT recommendations                     Jean Pierre Chappell DO  Department of Hospital Medicine   [facility]

## 2023-05-08 NOTE — PLAN OF CARE
05/08/23 1258   Discharge Assessment   Assessment Type Discharge Planning Assessment   Confirmed/corrected address, phone number and insurance Yes   Source of Information patient;family   Communicated MARIZA with patient/caregiver Date not available/Unable to determine   Reason For Admission swingbed   People in Home spouse   Facility Arrived From: [facility]   Do you expect to return to your current living situation? Yes   Do you have help at home or someone to help you manage your care at home? Yes   Who are your caregiver(s) and their phone number(s)? home health and famly   Prior to hospitilization cognitive status: Alert/Oriented   Current cognitive status: Alert/Oriented   Walking or Climbing Stairs ambulation difficulty, requires equipment   Dressing/Bathing bathing difficulty, assistance 1 person   Home Layout Able to live on 1st floor   Equipment Currently Used at Home shower chair;walker, rolling;oxygen;bedside commode   Readmission within 30 days? No   Patient currently being followed by outpatient case management? No   Do you currently have service(s) that help you manage your care at home? Yes   Name and Contact number of agency [facility]   Is the pt/caregiver preference to resume services with current agency Yes   Do you take prescription medications? Yes   Do you have prescription coverage? Yes   Coverage [place]   Do you have any problems affording any of your prescribed medications? No   Is the patient taking medications as prescribed? yes   Who is going to help you get home at discharge? son/daughter and spouse   How do you get to doctors appointments? family or friend will provide   Are you on dialysis? No   Do you take coumadin? No   Discharge Plan A Home with family;Home Health   DME Needed Upon Discharge  hospital bed   Discharge Barriers Identified None   Physical Activity   On average, how many days per week do you engage in moderate to strenuous exercise (like a brisk walk)? 0 days   On  average, how many minutes do you engage in exercise at this level? 0 min   Financial Resource Strain   How hard is it for you to pay for the very basics like food, housing, medical care, and heating? Not very   Housing Stability   In the last 12 months, was there a time when you were not able to pay the mortgage or rent on time? N   In the last 12 months, how many places have you lived? 1   In the last 12 months, was there a time when you did not have a steady place to sleep or slept in a shelter (including now)? N   Transportation Needs   In the past 12 months, has lack of transportation kept you from medical appointments or from getting medications? no   In the past 12 months, has lack of transportation kept you from meetings, work, or from getting things needed for daily living? No   Food Insecurity   Within the past 12 months, you worried that your food would run out before you got the money to buy more. Never true   Within the past 12 months, the food you bought just didn't last and you didn't have money to get more. Never true   Stress   Do you feel stress - tense, restless, nervous, or anxious, or unable to sleep at night because your mind is troubled all the time - these days? Only a littl   Social Connections   In a typical week, how many times do you talk on the phone with family, friends, or neighbors? More than 3   How often do you get together with friends or relatives? More than 3   How often do you attend Taoist or Lutheran services? Never   Do you belong to any clubs or organizations such as Taoist groups, unions, fraternal or athletic groups, or school groups? No   How often do you attend meetings of the clubs or organizations you belong to? Never   Are you , , , , never , or living with a partner?    Alcohol Use   Q1: How often do you have a drink containing alcohol? Never   Q2: How many drinks containing alcohol do you have on a typical day when you  are drinking? None   Q3: How often do you have six or more drinks on one occasion? Never     Patient admitted to [facility] for swingbed services. Family reports several hospitalizations over the past several months. He is followed by Brandon HERNANDEZ. PCP is Marta López in [place]. Family reports hospital bed and wheelchair have been ordered and will be delivered at time of dc. Questions/concerns addressed at time of assessment. Will continue to follow for dc needs

## 2023-05-08 NOTE — NURSING
"Pt's son stated that his dad has seemed to be getting more SOB over the past couple days. Gaby, PT also stated that he seemed to be more SOB today than usual. I went into assess pt and pt was seating on bedside commode, visible distress noted, accessory muscle use RR 26, 91% 2L NC, bilateral lower lung bases sound diminished. Pt son present at bedside. Myself, pts son, and Carmenza, CNA attempted to get pt back in bed but pt started having panic attack and stated that "he was smothering" Helped assist pt back to wheelchair as requested. Vistaril prn given @1415 per request for anxiety. CXR was also done. Messaged Dr. Chappell via secure chat to inform. Pt now resting and has calmed down. Care progressing.   "

## 2023-05-08 NOTE — PLAN OF CARE
Problem: Occupational Therapy  Goal: Occupational Therapy Goal  Description: STG: within 2 weeks  Pt will perform grooming with setup and independence at sinkside from sitting or standing ONGOING/PROGRESSING  Pt will bathe with SBA with spongebathing at sinkside ONGOING/PROGRESSING  Pt will perform UE dressing with setup and independence ONGOING/PROGRESSING  Pt will perform LE dressing with SBA with AE ONGOING/PROGRESSING  Pt will sit EOB x 30 min with no assistance ONGOING/PROGRESSING  Pt will transfer bed/chair/bsc with mod I ONGOING/PROGRESSING  Pt will perform standing task x 5 min with svn assistance ONGOING/PROGRESSING  Pt will tolerate 45 minutes of tx without fatigue ONGOING/PROGRESSING      LT.Restore to max I with self care and mobility.     Outcome: Ongoing, Progressing

## 2023-05-08 NOTE — PT/OT/SLP PROGRESS
Physical Therapy Treatment    Patient Name:  Shaji Welsh   MRN:  [id number]    Recommendations:     Discharge Recommendations: home with home health  Discharge Equipment Recommendations: none  Barriers to discharge: Inaccessible home and Decreased caregiver support    Assessment:     Shaji Welsh is a 82 y.o. male admitted with a medical diagnosis of Physical deconditioning.  He presents with the following impairments/functional limitations: weakness, impaired endurance, impaired functional mobility, gait instability, impaired balance, decreased lower extremity function, impaired cardiopulmonary response to activity, decreased ROM .        Rehab Prognosis: Fair; patient would benefit from acute skilled PT services to address these deficits and reach maximum level of function.    Recent Surgery: * No surgery found *      Plan:     During this hospitalization, patient to be seen 5 x/week to address the identified rehab impairments via gait training, therapeutic activities, therapeutic exercises and progress toward the following goals:    Plan of Care Expires:  06/02/23    Subjective     Chief Complaint: Pt c/o SOB. Son states pt has been more SOB this weekend compare to last Fri. Pt states that he has less LBP since performing seated lumbar trunk ext ex. States he forgot to use lumbar roll.  Patient/Family Comments/goals: Pt to dc home with wife when more functional.  Pain/Comfort:  Pain Rating 1: 0/10      Objective:     Communicated with pt/son prior to session. PT spoke with DEMARCUS Cantor after treatment about pt's son stating pt getting more SOB and she is to listen to his lungs. PT spoke to RT Michelle about pt's SPO2 decreasing easily during therapy and she stated PT can increase O2 from 2L to as high as 3L during therapy sessions. Patient found  up in wheelchair  with oxygen upon PT entry to room.     General Precautions: Standard, fall, respiratory  Orthopedic Precautions: spinal precautions (avoid trunk  "flexion and rotation due to back pain with LLE radiculopathy.)  Braces:    Respiratory Status: Nasal cannula, flow 2 L/min     Functional Mobility:  Transfers:     Sit to Stand:  contact guard assistance, moderate assistance, and of 2 persons with rolling walker  Toilet Transfer: minimum assistance, moderate assistance, and of 2 persons with  rolling walker  using  Stand Pivot and BSC  Gait: Pt attempted to amb, but began coughing and needing to expel phlegm and PT had his sit down due to safetly issues. The coughing cause his SPO2 to decrease to 89% on 2L. O2 increased to 3L. Pt attempted a second time to amb and after walking 5 ft with min A x 2 using RW/O2/wc trail, but sat down in wc stating he had to sit on the BSC and was, therefore, returned to his room.      AM-PAC 6 CLICK MOBILITY          Treatment & Education:  Nu-step at Level 1.5 x 3 min from wc with sats 92-93% on 2L. Seated ther ex with sats b/w 91-92% on 2L. Pt received passive stretching of seth HS and calves 2x30" while resting after Nu-step d/t SOB. Seated ther ex: LAQ 2X10 (1# RLE only), PF 1x10, DF 1x10, lumbar trunk ext x 5. Pt required 2 more rest breaks d/t SOB. After two attempts to ambulate, pt was returned to his room and performed a stand-pivot transfer using RW to [facility] with min Ax2. SPO2 and SOB monitored throughtout treatment session. Pt instructed in deep breathing with slow, pursed exhalation. PT made pt another towel roll to use in his bed as lumbar support.    Patient left  on BSC  with  DEMARCUS Cantor notified and son present..    GOALS:   Multidisciplinary Problems       Physical Therapy Goals          Problem: Physical Therapy    Goal Priority Disciplines Outcome Goal Variances Interventions   Physical Therapy Goal     PT, PT/OT Ongoing, Progressing     Description: STG - 2 weeks  1. Pt will be CGA with transfers.  2. Pt will be CGA with bed mob.  3. Pt will be min A/CGA to amb 75 ft using RW.  4. Pt will have ROM seth ankle DF " "WFL.    LTG - 4 weeks  1. Pt will be SBA with transfers.  2. Pt will be SBA with bed mob.  3. Pt will be SBA to amb 150 ft using RW.  4. Pt will gain 1/2 muscle grade strength BLE.  5. Pt's LBP will be abolished.  6. Pt will negotiate 4" step with SBA using RW.                       Time Tracking:     PT Received On: 05/08/23  PT Start Time: 1250     PT Stop Time: 1330  PT Total Time (min): 40 min     Billable Minutes: Gait Training 8, Therapeutic Activity 17, Therapeutic Exercise 15, and Total Time 40 min    Treatment Type: Treatment  PT/PTA: PT     Number of PTA visits since last PT visit: 0     05/08/2023  "

## 2023-05-08 NOTE — PLAN OF CARE
"  Problem: Physical Therapy  Goal: Physical Therapy Goal  Description: STG - 2 weeks  1. Pt will be CGA with transfers.-PROGRESSING  2. Pt will be CGA with bed mob.-PROGRESSING  3. Pt will be min A/CGA to amb 75 ft using RW.-PROGRESSING  4. Pt will have ROM seth ankle DF WFL.-PROGRESSING    LTG - 4 weeks  1. Pt will be SBA with transfers.  2. Pt will be SBA with bed mob.  3. Pt will be SBA to amb 150 ft using RW.  4. Pt will gain 1/2 muscle grade strength BLE.  5. Pt's LBP will be abolished.  6. Pt will negotiate 4" step with SBA using RW.  Outcome: Ongoing, Progressing     "

## 2023-05-08 NOTE — SUBJECTIVE & OBJECTIVE
Interval History: seems to be doing well, no complaints today.    Review of Systems   Respiratory:  Negative for shortness of breath.    Cardiovascular:  Negative for chest pain.   Gastrointestinal:  Negative for abdominal pain, nausea and vomiting.   Psychiatric/Behavioral:  Negative for agitation and confusion.    All other systems reviewed and are negative.  Objective:     Vital Signs (Most Recent):  Temp: 97.6 °F (36.4 °C) (05/08/23 0803)  Pulse: 70 (05/08/23 0851)  Resp: 20 (05/08/23 0851)  BP: (!) 143/68 (05/08/23 0803)  SpO2: 100 % (05/08/23 0851) Vital Signs (24h Range):  Temp:  [97.6 °F (36.4 °C)-98.1 °F (36.7 °C)] 97.6 °F (36.4 °C)  Pulse:  [70-84] 70  Resp:  [16-23] 20  SpO2:  [94 %-100 %] 100 %  BP: (137-143)/(56-68) 143/68     Weight: 88.3 kg (194 lb 9.6 oz)  Body mass index is 31.41 kg/m².    Intake/Output Summary (Last 24 hours) at 5/8/2023 1156  Last data filed at 5/8/2023 1025  Gross per 24 hour   Intake 1080 ml   Output 1350 ml   Net -270 ml         Physical Exam  Vitals reviewed.   Constitutional:       General: He is not in acute distress.     Appearance: Normal appearance.   HENT:      Head: Normocephalic and atraumatic.   Eyes:      General: No scleral icterus.     Extraocular Movements: Extraocular movements intact.      Conjunctiva/sclera: Conjunctivae normal.      Pupils: Pupils are equal, round, and reactive to light.   Cardiovascular:      Rate and Rhythm: Normal rate and regular rhythm.      Heart sounds: No murmur heard.    No friction rub. No gallop.   Pulmonary:      Effort: Pulmonary effort is normal. No respiratory distress.      Breath sounds: No wheezing, rhonchi or rales.      Comments: Diminished bilaterally   Abdominal:      General: Abdomen is flat. Bowel sounds are normal. There is no distension.      Palpations: Abdomen is soft.      Tenderness: There is no abdominal tenderness. There is no guarding.   Musculoskeletal:         General: No swelling.      Right lower leg: No  edema.      Left lower leg: No edema.   Skin:     General: Skin is warm and dry.      Coloration: Skin is not jaundiced.      Findings: No rash.   Neurological:      General: No focal deficit present.      Mental Status: He is alert and oriented to person, place, and time.      Sensory: No sensory deficit.      Motor: Weakness present.   Psychiatric:         Mood and Affect: Mood normal.         Behavior: Behavior normal.         Thought Content: Thought content normal.         Judgment: Judgment normal.           Significant Labs: All pertinent labs within the past 24 hours have been reviewed.  Recent Lab Results       None            Significant Imaging: I have reviewed all pertinent imaging results/findings within the past 24 hours.

## 2023-05-08 NOTE — PLAN OF CARE
Problem: Breathing Pattern Ineffective  Goal: Effective Breathing Pattern  Outcome: Ongoing, Progressing     Problem: Gas Exchange Impaired  Goal: Optimal Gas Exchange  Outcome: Ongoing, Progressing     Problem: Adjustment to Illness COPD (Chronic Obstructive Pulmonary Disease)  Goal: Optimal Chronic Illness Coping  Outcome: Ongoing, Progressing     Problem: Functional Ability Impaired COPD (Chronic Obstructive Pulmonary Disease)  Goal: Optimal Level of Functional Shannon  Outcome: Ongoing, Progressing     Problem: Infection COPD (Chronic Obstructive Pulmonary Disease)  Goal: Absence of Infection Signs and Symptoms  Outcome: Ongoing, Progressing     Problem: Respiratory Compromise COPD (Chronic Obstructive Pulmonary Disease)  Goal: Effective Oxygenation and Ventilation  Outcome: Ongoing, Progressing     Problem: Oral Intake Inadequate COPD (Chronic Obstructive Pulmonary Disease)  Goal: Improved Nutrition Intake  Outcome: Ongoing, Progressing

## 2023-05-09 NOTE — NURSING
Calculated weight from yesterday to today's and it indicates that pt has gained 6.9 lbs. Messaged Dr. Chappell via secure chat to inform.

## 2023-05-09 NOTE — PLAN OF CARE
Problem: Adult Inpatient Plan of Care  Goal: Plan of Care Review  Outcome: Ongoing, Progressing  Goal: Patient-Specific Goal (Individualized)  Outcome: Ongoing, Progressing  Goal: Absence of Hospital-Acquired Illness or Injury  Outcome: Ongoing, Progressing  Goal: Optimal Comfort and Wellbeing  Outcome: Ongoing, Progressing  Goal: Readiness for Transition of Care  Outcome: Ongoing, Progressing     Problem: Fall Injury Risk  Goal: Absence of Fall and Fall-Related Injury  Outcome: Ongoing, Progressing     Problem: Pain Acute  Goal: Acceptable Pain Control and Functional Ability  Outcome: Ongoing, Progressing     Problem: Fatigue  Goal: Improved Activity Tolerance  Outcome: Ongoing, Progressing     Problem: Skin Injury Risk Increased  Goal: Skin Health and Integrity  Outcome: Ongoing, Progressing     Problem: Impaired Wound Healing  Goal: Optimal Wound Healing  Outcome: Ongoing, Progressing

## 2023-05-09 NOTE — PT/OT/SLP PROGRESS
"Occupational Therapy   Treatment    Name: Shaji Welsh  MRN: [id number]  Admitting Diagnosis:  Physical deconditioning       Recommendations:     Discharge Recommendations: home health PT, home health OT, home with home health  Discharge Equipment Recommendations:  3-in-1 commode, grab bar, hip kit, oxygen, walker, rolling, shower chair  Barriers to discharge:  Other (Comment) (pt reports his wife is unable to help him physically at home due to her own health problems, so he needs to be in "top" shape to go home and "do for himself.")    Assessment:     Shaji Welsh is a 82 y.o. male with a medical diagnosis of Physical deconditioning.  He presents with increased SOB at times, oxygen set on 2L, resting helps him regain feeling of enough breath. Performance deficits affecting function are weakness, impaired endurance, impaired self care skills, impaired functional mobility, gait instability, impaired balance, impaired cardiopulmonary response to activity.     Rehab Prognosis:  Good; patient would benefit from acute skilled OT services to address these deficits and reach maximum level of function.       Plan:     Patient to be seen 5 x/week to address the above listed problems via self-care/home management, community/work re-entry, therapeutic exercises, therapeutic activities, wheelchair management/training  Plan of Care Expires: 05/26/23  Plan of Care Reviewed with: patient, caregiver, son    Subjective     Chief Complaint: weakness  Patient/Family Comments/goals: return home with min a with wife and family as needed  Pain/Comfort:   No discomfort noted today    Objective:     Communicated with: pt and family and LPTA prior to session.  Patient found up in chair with oxygen (2L) upon OT entry to room.    General Precautions: Standard, fall, respiratory    Orthopedic Precautions:Full weight bearing  Braces: N/A  Respiratory Status: Nasal cannula, flow 2 L/min     Occupational Performance:     Bed Mobility:    Na with " "OT today    Functional Mobility/Transfers:  See PT    Activities of Daily Living:  Toileting: supervision using urinal from seated in w/c      [facility] 6 Click ADL:      Treatment & Education:  UBE endurance and strength exercise x 6 minutes in preparation to complete ADLs and transfers from different surfaces such as BSC, chair, etc. with less assistance.  Increased time/effort needed with all the following:   Pt completed tabletop green putty task finding small objects to work on FM and  to promote strength for ADL ability and handling of equipment as needed.    Additionally, he completed 0.5kg ball lifting in sh flexion, ch press, and horiz abd/add x 10 reps each.      During mid session, pt stated "I need to use the urinal", so OT assisted pt back to his room and offered help to use urinal.  Pt states, "no, I can do it, just hand it to me, and I'll get it."  Pt able to complete with just SBA to hand him items.  He managed clothing I'ly.    Patient left up in chair per his request with call button in reach and family present    GOALS:   Multidisciplinary Problems       Occupational Therapy Goals          Problem: Occupational Therapy    Goal Priority Disciplines Outcome Interventions   Occupational Therapy Goal     OT, PT/OT Ongoing, Progressing    Description: STG: within 2 weeks  Pt will perform grooming with setup and independence at sinkside from sitting or standing ONGOING/PROGRESSING  Pt will bathe with SBA with spongebathing at sinkside ONGOING/PROGRESSING  Pt will perform UE dressing with setup and independence ONGOING/PROGRESSING  Pt will perform LE dressing with SBA with AE ONGOING/PROGRESSING  Pt will sit EOB x 30 min with no assistance ONGOING/PROGRESSING  Pt will transfer bed/chair/bsc with mod I ONGOING/PROGRESSING  Pt will perform standing task x 5 min with svn assistance ONGOING/PROGRESSING  Pt will tolerate 45 minutes of tx without fatigue ONGOING/PROGRESSING      LT.Restore to max I with " self care and mobility.                          Time Tracking:     OT Date of Treatment: 05/09/23  OT Start Time: 1445  OT Stop Time: 1525  OT Total Time (min): 40 min    Billable Minutes:Self Care/Home Management 8  Therapeutic Activity 20  Therapeutic Exercise 12    OT/ROSALINA: OT          5/9/2023

## 2023-05-09 NOTE — PLAN OF CARE
Problem: Breathing Pattern Ineffective  Goal: Effective Breathing Pattern  Outcome: Ongoing, Progressing     Problem: Gas Exchange Impaired  Goal: Optimal Gas Exchange  Outcome: Ongoing, Progressing     Problem: Adjustment to Illness COPD (Chronic Obstructive Pulmonary Disease)  Goal: Optimal Chronic Illness Coping  Outcome: Ongoing, Progressing     Problem: Functional Ability Impaired COPD (Chronic Obstructive Pulmonary Disease)  Goal: Optimal Level of Functional [place]  Outcome: Ongoing, Progressing     Problem: Infection COPD (Chronic Obstructive Pulmonary Disease)  Goal: Absence of Infection Signs and Symptoms  Outcome: Ongoing, Progressing     Problem: Oral Intake Inadequate COPD (Chronic Obstructive Pulmonary Disease)  Goal: Improved Nutrition Intake  Outcome: Ongoing, Progressing     Problem: Respiratory Compromise COPD (Chronic Obstructive Pulmonary Disease)  Goal: Effective Oxygenation and Ventilation  Outcome: Ongoing, Progressing

## 2023-05-09 NOTE — NURSING
"Resumed care @0713. Bedside handoff given from DEMARCUS Collins. Pt sitting up in wheelchair 2L NC on, son present @bedside. Pt denies needs. Helped assist pt to BSC with help of son, ANÍBAL applied to bilateral extremities. Pt son stated "I feel like he's just given up. And I think its because of his wife, that's why he says he's not ready to go home because its not like he doesn't have help, its because she's told him she doesn't want a hospital bed in her house and has made the comment that he will mess up her carpet by using the restroom on her floor."    Pt visibly has been more down progressively since his wife came over the weekend to visit. Pt's son stated if he had to, he will accommodate his house to his dads needs.   "

## 2023-05-09 NOTE — PT/OT/SLP PROGRESS
Physical Therapy Treatment    Patient Name:  Shaji Welsh   MRN:  [id number]    Recommendations:     Discharge Recommendations: home with home health  Discharge Equipment Recommendations: none  Barriers to discharge: Inaccessible home and Decreased caregiver support    Assessment:     Shaji Welsh is a 82 y.o. male admitted with a medical diagnosis of Physical deconditioning.  He presents with the following impairments/functional limitations: weakness, impaired endurance, impaired self care skills, impaired functional mobility, gait instability, impaired balance, decreased lower extremity function, impaired cardiopulmonary response to activity. Patient was provided with visual, verbal, and tactile cues for proper form of treatment tasks. Patient required occasional rest breaks due to fatigue. Patient's O2 sats were monitored throughout treatment on 2L of O2, which were 93-95% prior and remained above 93% while performing therapeutic exercises. Patient's O2 sats decreased to 89% after gait training, but increased to 94% after resting. While patient was resting he was instructed in pursed lip breathing. Patient with no adverse effects to treatment.         Rehab Prognosis: Fair; patient would benefit from acute skilled PT services to address these deficits and reach maximum level of function.    Recent Surgery: * No surgery found *      Plan:     During this hospitalization, patient to be seen 5 x/week to address the identified rehab impairments via gait training, therapeutic activities, therapeutic exercises and progress toward the following goals:    Plan of Care Expires:  06/02/23    Subjective     Chief Complaint: Bottom pain  Patient/Family Comments/goals: Pt to dc home with wife when more functional.  Pain/Comfort:  Pain Rating 1: 4/10  Location - Side 1: Bilateral  Location - Orientation 1: generalized  Location 1: other (see comments) (bottom)  Pain Addressed 1: Reposition      Objective:     Communicated with  "pt/son prior to session. Patient found up in chair with oxygen upon PTA entry to room.     General Precautions: Standard, fall, respiratory  Orthopedic Precautions: spinal precautions (avoid trunk flexion and rotation due to back pain with LLE radiculopathy.)  Braces:    Respiratory Status: Nasal cannula, flow 2 L/min     Functional Mobility:  Transfers:     Sit to Stand:  moderate assistance, maximal assistance, and of 2 persons with rolling walker  Gait: Patient ambulated 7 feet then sat and rested then another 7 feet with RW, min/mod A x 2 and wc trail.   Patient scooted back in wc once with SBA, and again with mod/max A x 2.      AM-PAC 6 CLICK MOBILITY  Turning over in bed (including adjusting bedclothes, sheets and blankets)?: 3  Sitting down on and standing up from a chair with arms (e.g., wheelchair, bedside commode, etc.): 2  Moving from lying on back to sitting on the side of the bed?: 2  Moving to and from a bed to a chair (including a wheelchair)?: 3  Need to walk in hospital room?: 3  Climbing 3-5 steps with a railing?: 1  Basic Mobility Total Score: 14       Treatment & Education:  Patient performed the following exercises 20x BLE: LAQs with 1.5#, HS curls with red TB, Hip ABD with red TB, Hip ADD squeezes with 3" hold, seated marching with red TB.    Patient son bought him a new wc, so PTA adjusted wc to correctly fit the patient.      Patient left up in chair with towel roll at low back and with  OT present.    GOALS:   Multidisciplinary Problems       Physical Therapy Goals          Problem: Physical Therapy    Goal Priority Disciplines Outcome Goal Variances Interventions   Physical Therapy Goal     PT, PT/OT Ongoing, Progressing     Description: STG - 2 weeks  1. Pt will be CGA with transfers.-PROGRESSING  2. Pt will be CGA with bed mob.-PROGRESSING  3. Pt will be min A/CGA to amb 75 ft using RW.-PROGRESSING  4. Pt will have ROM seth ankle DF WFL.-PROGRESSING    LTG - 4 weeks  1. Pt will be SBA " "with transfers.  2. Pt will be SBA with bed mob.  3. Pt will be SBA to amb 150 ft using RW.  4. Pt will gain 1/2 muscle grade strength BLE.  5. Pt's LBP will be abolished.  6. Pt will negotiate 4" step with SBA using RW.                       Time Tracking:     PT Received On: 05/09/23  PT Start Time: 1358     PT Stop Time: 1445  PT Total Time (min): 47 min     Billable Minutes: Gait Training 10, Therapeutic Activity 8, and Therapeutic Exercise 29    Treatment Type: Treatment  PT/PTA: PTA     Number of PTA visits since last PT visit: 1 05/09/2023  "

## 2023-05-09 NOTE — CONSULTS
"[facility]    Clinical Nutrition Assessment          Date: 5/9/2023 11:20 AM    Consult received re: Assess/educate regarding nutritional needs     Shaji Welsh is a 82 y.o. male with   Active Hospital Problems    Diagnosis  POA    *Physical deconditioning [R53.81]  Yes    Weakness of both lower extremities [R29.898]  Yes    Chronic obstruct airways disease [J44.9]  Yes    Chronic systolic heart failure [I50.22]  Yes    CAD (coronary artery disease) [I25.10]  Yes    HTN (hypertension) [I10]  Yes      Resolved Hospital Problems   No resolved problems to display.       PMH:  has a past medical history of CHF (congestive heart failure), COPD (chronic obstructive pulmonary disease), and Coronary artery disease.    Nutrition/Diet History  Spiritual, Cultural Beliefs, Buddhist Practices, Values that Affect Care: no    Diet Order: Diet Cardiac  Oral Supplements: none     5/9: Fair appetite. Po intake ranging from 25%-100%.     Anthropometrics:   Height: 5' 6" (167.6 cm)  Weight (5/9/23): 91.1 kg (200 lb 14.4 oz)  IBW: 142 lbs   BMI (Calculated): 32.4  BMI Classification: Obese    Labs:   No results for input(s): NA, K, BUN, CREATININE, GLU, CALCIUM, ALBUMIN, CL, ALT, AST, PHOS in the last 72 hours.  Last A1c: No results found for: HGBA1C  Lab Results   Component Value Date    RBC 3.86 (L) 05/05/2023    HGB 10.3 (L) 05/05/2023    HCT 34.2 (L) 05/05/2023    MCV 88.6 05/05/2023    MCH 26.7 (L) 05/05/2023    MCHC 30.1 (L) 05/05/2023       Meds:   Scheduled Meds:   aspirin  81 mg Oral Daily    atorvastatin  20 mg Oral QHS    calcium-vitamin D3  1 tablet Oral Daily    clopidogreL  75 mg Oral Daily    diltiaZEM  240 mg Oral Daily    fluticasone-salmeterol 250-50 mcg/dose  1 puff Inhalation BID    furosemide  40 mg Oral Daily    potassium chloride SA  20 mEq Oral Daily    rOPINIRole  0.5 mg Oral BID    sertraline  50 mg Oral QHS    zinc oxide   Topical (Top) Daily     Continuous Infusions: " none   PRN Meds:.acetaminophen, calcium carbonate, hydrOXYzine pamoate, melatonin, senna-docusate 8.6-50 mg, traMADoL    Physical Review:  General: glasses at bedside   Abd/GI: rounded, obese   Last Bowel Movement: 23  Ext:  +2 generalized edema   Skin: incontinence associated dermatitis to midline buttocks    Bran Score:   Bran Risk Assessment  Sensory Perception: 3-->slightly limited  Moisture: 3-->occasionally moist  Activity: 3-->walks occasionally  Mobility: 3-->slightly limited  Nutrition: 3-->adequate  Friction and Shear: 2-->potential problem  Bran Score: 17    Malnutrition Screening Tool  Have you recently lost weight without trying?: No  Weight loss score: 0  Have you been eating poorly because of a decreased appetite?: No  Appetite score: 0  MST Score: 0    Estimated Nutrition Needs:   18-22 kcal/[id number] kcal/day  1.2-1.5 g pro/kg IBW: 78-98 g protein/day   8914-2451 ml fluid/day      Recommendations/ Intervention:  Continue cardiac diet as tolerated    Encourage po intake and fluids    Daily weights    RD to monitor po intake,wt,nutrition-related labs, skin changes, and meds   \  Nutrition Risk: moderate    Signed  Dominga Jimenez, MS, RD, LD  Available via [employer]

## 2023-05-09 NOTE — PLAN OF CARE
Problem: Breathing Pattern Ineffective  Goal: Effective Breathing Pattern  Outcome: Ongoing, Progressing     Problem: Gas Exchange Impaired  Goal: Optimal Gas Exchange  Outcome: Ongoing, Progressing     Problem: Adjustment to Illness COPD (Chronic Obstructive Pulmonary Disease)  Goal: Optimal Chronic Illness Coping  Outcome: Ongoing, Progressing     Problem: Functional Ability Impaired COPD (Chronic Obstructive Pulmonary Disease)  Goal: Optimal Level of Functional [place]  Outcome: Ongoing, Progressing     Problem: Infection COPD (Chronic Obstructive Pulmonary Disease)  Goal: Absence of Infection Signs and Symptoms  Outcome: Ongoing, Progressing     Problem: Oral Intake Inadequate COPD (Chronic Obstructive Pulmonary Disease)  Goal: Improved Nutrition Intake  Outcome: Ongoing, Progressing     Problem: Respiratory Compromise COPD (Chronic Obstructive Pulmonary Disease)  Goal: Effective Oxygenation and Ventilation  Outcome: Ongoing, Progressing

## 2023-05-09 NOTE — PLAN OF CARE
Patient discussed in multidisciplinary team meeting. PT/OT reports patient is doing well with therapy. When he was participating yesterday, he suffered from SOB.  Plan to dc home with home health. Patient and spouse reports that he is current with [facility]. Will continue to follow for dc needs.

## 2023-05-10 NOTE — PROGRESS NOTES
[facility] Medicine  Progress Note    Patient Name: Shaji Welsh  MRN: [id number]  Patient Class: IP- Swing   Admission Date: 5/4/2023  Length of Stay: 6 days  Attending Physician: Jean Pierre Chappell DO  Primary Care Provider: LIZETTE Taylor        Subjective:     Principal Problem:Physical deconditioning        HPI:  Mr. Welsh is a 83 y/o WM with a PMH of HTN, CHF, COPD, CAD s/p stent, and anxiety disorder who presents for admission to [facility] services. He was previously seen at [facility] on 4/5/23 with increased SOB. He was found to have CAP and CHF and treated with Lasix with no improvement of symptoms. He was then transferred to [facility] at family request for higher level of care. CTA PE protocol was performed and negative for PE. He was also evaluated for worsening left leg weakness with onset 11/22 and new onset right leg weakness. A MRI brain and spine was performed due to BLE weakness and was negative. He was started on Zosyn for pneumonia but his leukocytosis worsened. He was the seed by Infectious Disease and started on Cefdinir, Acyclovir, and Ampicillin for empiric meningitis coverage. Patient was unable to tolerate a LP. He demonstrated clinical improvement with this treatment, his leukocytosis trended down, and his oxygenation status returned to baseline. ID recommended a 14 day course of abx at discharge. Patient was evaluated by PT and OT and comprehensive inpatient rehabilitation  was recommended. Patient's daughter, who is a NP, request admission to [facility] for rehabilitation services.      Overview/Hospital Course:  5/4 Patient is sitting up in Luciano chair watching TV in [facility]. Son is at bedside. He is pleasant and denies any discomfort or sob at present. 1+ edema noted BLE. Patient encouraged to elevate lower extremities as much as possible. Patient appears upbeat about PT/OT and expresses hope it will strengthen his lower  extremities and increase his mobility and independence.    5/8 Seen today, no major issues.  Went to therapy.  Sitting in WC today.  No complaints.     5/10 Asking about Mucinex for sputum.  Will add that BID as well as Mucomyst prn.  Also discussed with patient and family about use of disrespectful language and cursing towards our staff.  Told them it will not be tolerated and next time it happens he will be discharged home with home health.  I instructed him to tell family to apologize to staff.       Interval History: No major issues, working on mucous production     Review of Systems   Respiratory:  Positive for cough. Negative for shortness of breath.    Cardiovascular:  Negative for chest pain.   Gastrointestinal:  Negative for abdominal pain, nausea and vomiting.   Psychiatric/Behavioral:  Negative for agitation and confusion.    All other systems reviewed and are negative.  Objective:     Vital Signs (Most Recent):  Temp: 97.9 °F (36.6 °C) (05/10/23 0702)  Pulse: 75 (05/10/23 0844)  Resp: 20 (05/10/23 0844)  BP: 138/65 (05/10/23 0702)  SpO2: 97 % (05/10/23 0844) Vital Signs (24h Range):  Temp:  [97.8 °F (36.6 °C)-97.9 °F (36.6 °C)] 97.9 °F (36.6 °C)  Pulse:  [72-76] 75  Resp:  [20-23] 20  SpO2:  [94 %-97 %] 97 %  BP: (138-139)/(56-65) 138/65     Weight: 91.1 kg (200 lb 14.4 oz)  Body mass index is 32.43 kg/m².    Intake/Output Summary (Last 24 hours) at 5/10/2023 0939  Last data filed at 5/9/2023 2240  Gross per 24 hour   Intake 690 ml   Output 2001 ml   Net -1311 ml         Physical Exam  Vitals reviewed.   Constitutional:       General: He is not in acute distress.     Appearance: Normal appearance.   HENT:      Head: Normocephalic and atraumatic.   Eyes:      General: No scleral icterus.     Extraocular Movements: Extraocular movements intact.      Conjunctiva/sclera: Conjunctivae normal.      Pupils: Pupils are equal, round, and reactive to light.   Cardiovascular:      Rate and Rhythm: Normal rate and  regular rhythm.      Heart sounds: No murmur heard.    No friction rub. No gallop.   Pulmonary:      Effort: Pulmonary effort is normal. No respiratory distress.      Breath sounds: No wheezing, rhonchi or rales.      Comments: Diminished bilaterally   Abdominal:      General: Abdomen is flat. Bowel sounds are normal. There is no distension.      Palpations: Abdomen is soft.      Tenderness: There is no abdominal tenderness. There is no guarding.   Musculoskeletal:         General: No swelling.      Right lower leg: No edema.      Left lower leg: No edema.   Skin:     General: Skin is warm and dry.      Coloration: Skin is not jaundiced.      Findings: No rash.   Neurological:      General: No focal deficit present.      Mental Status: He is alert and oriented to person, place, and time.      Sensory: No sensory deficit.      Motor: Weakness present.   Psychiatric:         Mood and Affect: Mood normal.         Behavior: Behavior normal.         Thought Content: Thought content normal.         Judgment: Judgment normal.           Significant Labs: All pertinent labs within the past 24 hours have been reviewed.  Recent Lab Results       None            Significant Imaging: I have reviewed all pertinent imaging results/findings within the past 24 hours.      Assessment/Plan:      HTN (hypertension)  Continue home meds.  Adjust as needed.       CAD (coronary artery disease)  Stable.       Chronic systolic heart failure  Continue meds, monitor weights.  Cardiac diet.       Chronic obstruct airways disease  Nebs, O2.  Monitor.         VTE Risk Mitigation (From admission, onward)         Ordered     IP VTE LOW RISK PATIENT  Once         05/04/23 1924                Discharge Planning   MARIZA:      Code Status: Full Code   Is the patient medically ready for discharge?: No    Reason for patient still in hospital (select all that apply): Patient trending condition and PT / OT recommendations  Discharge Plan A: Home with family,  Home Health                  Jean Pierre Chappell DO  Department of Hospital Medicine   [facility]

## 2023-05-10 NOTE — SUBJECTIVE & OBJECTIVE
Interval History: No major issues, working on mucous production     Review of Systems   Respiratory:  Positive for cough. Negative for shortness of breath.    Cardiovascular:  Negative for chest pain.   Gastrointestinal:  Negative for abdominal pain, nausea and vomiting.   Psychiatric/Behavioral:  Negative for agitation and confusion.    All other systems reviewed and are negative.  Objective:     Vital Signs (Most Recent):  Temp: 97.9 °F (36.6 °C) (05/10/23 0702)  Pulse: 75 (05/10/23 0844)  Resp: 20 (05/10/23 0844)  BP: 138/65 (05/10/23 0702)  SpO2: 97 % (05/10/23 0844) Vital Signs (24h Range):  Temp:  [97.8 °F (36.6 °C)-97.9 °F (36.6 °C)] 97.9 °F (36.6 °C)  Pulse:  [72-76] 75  Resp:  [20-23] 20  SpO2:  [94 %-97 %] 97 %  BP: (138-139)/(56-65) 138/65     Weight: 91.1 kg (200 lb 14.4 oz)  Body mass index is 32.43 kg/m².    Intake/Output Summary (Last 24 hours) at 5/10/2023 0939  Last data filed at 5/9/2023 2240  Gross per 24 hour   Intake 690 ml   Output 2001 ml   Net -1311 ml         Physical Exam  Vitals reviewed.   Constitutional:       General: He is not in acute distress.     Appearance: Normal appearance.   HENT:      Head: Normocephalic and atraumatic.   Eyes:      General: No scleral icterus.     Extraocular Movements: Extraocular movements intact.      Conjunctiva/sclera: Conjunctivae normal.      Pupils: Pupils are equal, round, and reactive to light.   Cardiovascular:      Rate and Rhythm: Normal rate and regular rhythm.      Heart sounds: No murmur heard.    No friction rub. No gallop.   Pulmonary:      Effort: Pulmonary effort is normal. No respiratory distress.      Breath sounds: No wheezing, rhonchi or rales.      Comments: Diminished bilaterally   Abdominal:      General: Abdomen is flat. Bowel sounds are normal. There is no distension.      Palpations: Abdomen is soft.      Tenderness: There is no abdominal tenderness. There is no guarding.   Musculoskeletal:         General: No swelling.      Right  lower leg: No edema.      Left lower leg: No edema.   Skin:     General: Skin is warm and dry.      Coloration: Skin is not jaundiced.      Findings: No rash.   Neurological:      General: No focal deficit present.      Mental Status: He is alert and oriented to person, place, and time.      Sensory: No sensory deficit.      Motor: Weakness present.   Psychiatric:         Mood and Affect: Mood normal.         Behavior: Behavior normal.         Thought Content: Thought content normal.         Judgment: Judgment normal.           Significant Labs: All pertinent labs within the past 24 hours have been reviewed.  Recent Lab Results       None            Significant Imaging: I have reviewed all pertinent imaging results/findings within the past 24 hours.

## 2023-05-10 NOTE — PLAN OF CARE
Problem: Breathing Pattern Ineffective  Goal: Effective Breathing Pattern  Outcome: Ongoing, Progressing     Problem: Gas Exchange Impaired  Goal: Optimal Gas Exchange  Outcome: Ongoing, Progressing     Problem: Adjustment to Illness COPD (Chronic Obstructive Pulmonary Disease)  Goal: Optimal Chronic Illness Coping  Outcome: Ongoing, Progressing     Problem: Functional Ability Impaired COPD (Chronic Obstructive Pulmonary Disease)  Goal: Optimal Level of Functional [place]  Outcome: Ongoing, Progressing     Problem: Infection COPD (Chronic Obstructive Pulmonary Disease)  Goal: Absence of Infection Signs and Symptoms  Outcome: Ongoing, Progressing     Problem: Oral Intake Inadequate COPD (Chronic Obstructive Pulmonary Disease)  Goal: Improved Nutrition Intake  Outcome: Ongoing, Progressing     Problem: Respiratory Compromise COPD (Chronic Obstructive Pulmonary Disease)  Goal: Effective Oxygenation and Ventilation  Outcome: Ongoing, Progressing

## 2023-05-10 NOTE — PT/OT/SLP PROGRESS
Physical Therapy Treatment    Patient Name:  Shaji Welsh   MRN:  [id number]    Recommendations:     Discharge Recommendations: home with home health  Discharge Equipment Recommendations: none  Barriers to discharge: Inaccessible home and Decreased caregiver support    Assessment:     Shaji Welsh is a 82 y.o. male admitted with a medical diagnosis of Physical deconditioning.  He presents with the following impairments/functional limitations: weakness, impaired endurance, impaired functional mobility, gait instability, impaired balance, decreased lower extremity function .    Pt was on 3L O2 throughout PT session with SPO2 97-98%.    Rehab Prognosis: Fair; patient would benefit from acute skilled PT services to address these deficits and reach maximum level of function.    Recent Surgery: * No surgery found *      Plan:     During this hospitalization, patient to be seen 5 x/week to address the identified rehab impairments via gait training, therapeutic activities, therapeutic exercises and progress toward the following goals:    Plan of Care Expires:  06/02/23    Subjective     Chief Complaint: No new c/o.  Patient/Family Comments/goals: Dc home when safe to do so.  Pain/Comfort:  Pain Rating 1: 0/10      Objective:     Communicated with pt prior to session.  Patient found up in chair with oxygen upon PT entry to room.     General Precautions: Standard, fall  Orthopedic Precautions: spinal precautions (avoid trunk flexion and rotation due to back pain with LLE radiculopathy.)  Braces:    Respiratory Status: Nasal cannula, flow 2 L/min     Functional Mobility:  Transfers:     Sit to Stand:  moderate assistance with rolling walker  Gait: Pt amb'd with RW x 16 ft with min-mod Ax1  and min/CGA x 1 with O2 at 3L and wc trail.      AM-PAC 6 CLICK MOBILITY          Treatment & Education:  Nu-step at Level 1.5 using BLE only x 5 minutes. Seated ther ex x 10 reps each: resisted hip abd (RLE green tband/LLE red tband),  "resisted knee flex (RLE green tband/LLE red tband), resisted DF (RLE green tband/LLE red tband), PF over edge of 8" stool, toe taps to 8" step stool. Pt received passive stretching of soleus with heels hanging off edge of step stool.     Patient left up in chair with call button in reach and brother present..    GOALS:   Multidisciplinary Problems       Physical Therapy Goals          Problem: Physical Therapy    Goal Priority Disciplines Outcome Goal Variances Interventions   Physical Therapy Goal     PT, PT/OT Ongoing, Progressing     Description: STG - 2 weeks  1. Pt will be CGA with transfers.-PROGRESSING  2. Pt will be CGA with bed mob.-PROGRESSING  3. Pt will be min A/CGA to amb 75 ft using RW.-PROGRESSING  4. Pt will have ROM seth ankle DF WFL.-PROGRESSING    LTG - 4 weeks  1. Pt will be SBA with transfers.  2. Pt will be SBA with bed mob.  3. Pt will be SBA to amb 150 ft using RW.  4. Pt will gain 1/2 muscle grade strength BLE.  5. Pt's LBP will be abolished.  6. Pt will negotiate 4" step with SBA using RW.                       Time Tracking:     PT Received On: 05/10/23  PT Start Time: 1410     PT Stop Time: 1440  PT Total Time (min): 30 min     Billable Minutes: Gait Training 8, Therapeutic Activity 3, Therapeutic Exercise 19, and Total Time 30 minutes    Treatment Type: Treatment  PT/PTA: PT     Number of PTA visits since last PT visit: 0     05/10/2023  "

## 2023-05-10 NOTE — PLAN OF CARE
Problem: Breathing Pattern Ineffective  Goal: Effective Breathing Pattern  Outcome: Ongoing, Progressing     Problem: Gas Exchange Impaired  Goal: Optimal Gas Exchange  Outcome: Ongoing, Progressing     Problem: Adjustment to Illness COPD (Chronic Obstructive Pulmonary Disease)  Goal: Optimal Chronic Illness Coping  Outcome: Ongoing, Progressing     Problem: Functional Ability Impaired COPD (Chronic Obstructive Pulmonary Disease)  Goal: Optimal Level of Functional [place]  Outcome: Ongoing, Progressing     Problem: Infection COPD (Chronic Obstructive Pulmonary Disease)  Goal: Absence of Infection Signs and Symptoms  Outcome: Ongoing, Progressing     Problem: Oral Intake Inadequate COPD (Chronic Obstructive Pulmonary Disease)  Goal: Improved Nutrition Intake  Outcome: Ongoing, Progressing     Problem: Respiratory Compromise COPD (Chronic Obstructive Pulmonary Disease)  Goal: Effective Oxygenation and Ventilation  Outcome: Ongoing, Progressing

## 2023-05-10 NOTE — PT/OT/SLP PROGRESS
"Occupational Therapy   Treatment    Name: Shaji Welsh  MRN: [id number]  Admitting Diagnosis:  Physical deconditioning       Recommendations:     Discharge Recommendations: home health PT, home health OT, home with home health  Discharge Equipment Recommendations:  3-in-1 commode, grab bar, hip kit, oxygen, walker, rolling, shower chair  Barriers to discharge:  Other (Comment) (pt reports his wife is unable to help him physically at home due to her own health problems, so he needs to be in "top" shape to go home and "do for himself.")    Assessment:     Shaji Welsh is a 82 y.o. male with a medical diagnosis of Physical deconditioning.  He presents with no new complaints. Performance deficits affecting function are weakness, impaired endurance, impaired self care skills, impaired functional mobility, gait instability, impaired balance, impaired cardiopulmonary response to activity.     Rehab Prognosis:  Good; patient would benefit from acute skilled OT services to address these deficits and reach maximum level of function.       Plan:     Patient to be seen 5 x/week to address the above listed problems via self-care/home management, community/work re-entry, therapeutic exercises, therapeutic activities, wheelchair management/training  Plan of Care Expires: 05/26/23  Plan of Care Reviewed with: patient, caregiver, son    Subjective     Chief Complaint: SOB, fatigue, weakness  Patient/Family Comments/goals: return home to sba level with wife  Pain/Comfort:       Objective:     Communicated with: pt and his friend in room with him prior to session.  Patient found up in chair with oxygen (2L) upon OT entry to room.    General Precautions: Standard, fall, respiratory    Orthopedic Precautions:Full weight bearing  Braces: N/A  Respiratory Status: Nasal cannula, flow 2 L/min     Occupational Performance:     Bed Mobility:    Na with OT    Functional Mobility/Transfers:  Na with OT     Activities of Daily Living:  Na with " OT      [facility] 6 Click ADL:      Treatment & Education:  OT engaged pt in UBE x 8 min, fair completion, needing RB's due to fatigue and increased difficulty to complete.  After this, OT assessed pt oxygen sats which were at 91 to 92% with supp ox at 2L.  Pt rested prior to completing lat pulls and tricep presses with blue tubing x 20 reps x 2 sets each to promote pushing from surfaces and sit to stand attempts.  Finally, pt complete gripper x level 3 resist x 10 reps x 3 sets each hand.      OT returned pt to his room due to OT had an outpt scheduled for this time of day and had to end his session.    Patient left up in chair with call button in reach and after washing hands at sinkside with setup only required.    GOALS:   Multidisciplinary Problems       Occupational Therapy Goals          Problem: Occupational Therapy    Goal Priority Disciplines Outcome Interventions   Occupational Therapy Goal     OT, PT/OT Ongoing, Progressing    Description: STG: within 2 weeks  Pt will perform grooming with setup and independence at sinkside from sitting or standing ONGOING/PROGRESSING  Pt will bathe with SBA with spongebathing at sinkside ONGOING/PROGRESSING  Pt will perform UE dressing with setup and independence ONGOING/PROGRESSING  Pt will perform LE dressing with SBA with AE ONGOING/PROGRESSING  Pt will sit EOB x 30 min with no assistance ONGOING/PROGRESSING  Pt will transfer bed/chair/bsc with mod I ONGOING/PROGRESSING  Pt will perform standing task x 5 min with svn assistance ONGOING/PROGRESSING  Pt will tolerate 45 minutes of tx without fatigue ONGOING/PROGRESSING      LT.Restore to max I with self care and mobility.                          Time Tracking:     OT Date of Treatment: 05/10/23  OT Start Time: 1246  OT Stop Time: 1314  OT Total Time (min): 28 min    Billable Minutes:Therapeutic Exercise 28    OT/ROSALINA: OT          5/10/2023

## 2023-05-11 NOTE — PT/OT/SLP PROGRESS
"Occupational Therapy   Treatment    Name: Shaji Welsh  MRN: [id number]  Admitting Diagnosis:  Physical deconditioning       Recommendations:     Discharge Recommendations: home health PT, home health OT, home with home health  Discharge Equipment Recommendations:  3-in-1 commode, grab bar, hip kit, oxygen, walker, rolling, shower chair  Barriers to discharge:  Other (Comment) (pt reports his wife is unable to help him physically at home due to her own health problems, so he needs to be in "top" shape to go home and "do for himself.")    Assessment:     Shaji Welsh is a 82 y.o. male with a medical diagnosis of Physical deconditioning.  He presents with no new complaints, coming from PT session. Performance deficits affecting function are weakness, impaired endurance, impaired self care skills, impaired functional mobility, gait instability, impaired balance, impaired cardiopulmonary response to activity.     Rehab Prognosis:  Good; patient would benefit from acute skilled OT services to address these deficits and reach maximum level of function.       Plan:     Patient to be seen 5 x/week to address the above listed problems via self-care/home management, community/work re-entry, therapeutic exercises, therapeutic activities, wheelchair management/training  Plan of Care Expires: 05/26/23  Plan of Care Reviewed with: patient, caregiver, son    Subjective     Chief Complaint: weakness, SOB  Patient/Family Comments/goals: return home with sba with all self care and mobility  Pain/Comfort:       Objective:     Communicated with: pt and LPTA prior to session.  Patient found up in chair with oxygen (2L) upon OT entry to room.    General Precautions: Standard, fall, respiratory    Orthopedic Precautions:Full weight bearing  Braces: N/A  Respiratory Status: Nasal cannula, flow 2 L/min     Occupational Performance:     Activities of Daily Living:  Grooming: stand by assistance sinkside, setup, requiring OT to reach to " activate paper towel dispenser for pt, but he was able to reach and get paper towel pulled off       [facility] 6 Click ADL:      Treatment & Education:  OT engaged pt in UBE x 12 min total R/F motion low resist, no RB's needed today.  Then, pt completed reciprocal pulleys x 6 min for shoulder flexion; after this, pt performed gripper x 10 reps of 3 sets each hand with black handled gripper (moderate strength resist).  Then, pt completed card matching on vertical board requiring pt to lean forward and engage core to impact all self care at sink, for reaching forward and above head with self care skills.  Pt had some difficulty but was able to complete task with minimal assist only occasionally.  Oxygen maintained at 92 to 94% as assessed throughout session with 2L supplemental oxygen nasal cannula.  Pt also demonstrates good pursed lip breathing technique and knowledge throughout as needed.    Patient left up in chair with call button in reach    GOALS:   Multidisciplinary Problems       Occupational Therapy Goals          Problem: Occupational Therapy    Goal Priority Disciplines Outcome Interventions   Occupational Therapy Goal     OT, PT/OT Ongoing, Progressing    Description: STG: within 2 weeks  Pt will perform grooming with setup and independence at sinkside from sitting or standing ONGOING/PROGRESSING  Pt will bathe with SBA with spongebathing at sinkside ONGOING/PROGRESSING  Pt will perform UE dressing with setup and independence ONGOING/PROGRESSING  Pt will perform LE dressing with SBA with AE ONGOING/PROGRESSING  Pt will sit EOB x 30 min with no assistance ONGOING/PROGRESSING  Pt will transfer bed/chair/bsc with mod I ONGOING/PROGRESSING  Pt will perform standing task x 5 min with svn assistance ONGOING/PROGRESSING  Pt will tolerate 45 minutes of tx without fatigue ONGOING/PROGRESSING      LT.Restore to max I with self care and mobility.                          Time Tracking:     OT Date of Treatment:  05/11/23  OT Start Time: 1308  OT Stop Time: 1350  OT Total Time (min): 42 min    Billable Minutes:Self Care/Home Management 3  Therapeutic Activity 20  Therapeutic Exercise 19    OT/ROSALINA: OT          5/11/2023

## 2023-05-11 NOTE — PT/OT/SLP PROGRESS
Physical Therapy Treatment    Patient Name:  Shaji Welsh   MRN:  [id number]    Recommendations:     Discharge Recommendations: home with home health  Discharge Equipment Recommendations: none  Barriers to discharge: Inaccessible home and Decreased caregiver support    Assessment:     Shaji Welsh is a 82 y.o. male admitted with a medical diagnosis of Physical deconditioning.  He presents with the following impairments/functional limitations: weakness, impaired endurance, impaired functional mobility, impaired balance, gait instability, impaired self care skills, decreased lower extremity function. Patient was provided with visual and verbal cues for proper form of treatment tasks. Patient required frequent rest breaks due to fatigue. Patient's O2 sats were monitored throughout treatment on 2L of O2, which were 94% prior and remained above 91% while performing therapeutic exercises. While patient was resting he was instructed in pursed lip breathing. Patient with no adverse effects to treatment.         Rehab Prognosis: Fair; patient would benefit from acute skilled PT services to address these deficits and reach maximum level of function.    Recent Surgery: * No surgery found *      Plan:     During this hospitalization, patient to be seen 5 x/week to address the identified rehab impairments via gait training, therapeutic activities, therapeutic exercises and progress toward the following goals:    Plan of Care Expires:  06/02/23    Subjective     Chief Complaint: Bottom pain  Patient/Family Comments/goals: Pt to dc home with wife when more functional.  Pain/Comfort:  Pain Rating 1:  (Patient stated that he had pain in his bottom, but he did not give pain rating.)  Pain Addressed 1: Reposition      Objective:     Communicated with pt prior to session. Patient found up in chair with oxygen upon PTA entry to room.     General Precautions: Standard, fall  Orthopedic Precautions: spinal precautions (avoid trunk flexion  "and rotation due to back pain with LLE radiculopathy.)  Braces:    Respiratory Status: Nasal cannula, flow 2 L/min     Functional Mobility:  Transfers:     Sit to Stand:  minimum assistance, moderate assistance, and of 2 persons with rolling walker  Gait: Patient ambulated 17 feet with RW, mod A x 1 and wc trail and O2 at 3L.   Patient scooted back in wc with max A x 1.      AM-PAC 6 CLICK MOBILITY  Turning over in bed (including adjusting bedclothes, sheets and blankets)?: 3  Sitting down on and standing up from a chair with arms (e.g., wheelchair, bedside commode, etc.): 2  Moving from lying on back to sitting on the side of the bed?: 2  Moving to and from a bed to a chair (including a wheelchair)?: 3  Need to walk in hospital room?: 3  Climbing 3-5 steps with a railing?: 1  Basic Mobility Total Score: 14       Treatment & Education:  Patient performed the following exercises 20x BLE: LAQs with 1.5#, Hip ABD with red TB, Hip ADD squeezes with 3" hold, seated marching with red TB.    Patient left up in chair with towel roll at low back and with  OT present.    GOALS:   Multidisciplinary Problems       Physical Therapy Goals          Problem: Physical Therapy    Goal Priority Disciplines Outcome Goal Variances Interventions   Physical Therapy Goal     PT, PT/OT Ongoing, Progressing     Description: STG - 2 weeks  1. Pt will be CGA with transfers.-PROGRESSING  2. Pt will be CGA with bed mob.-PROGRESSING  3. Pt will be min A/CGA to amb 75 ft using RW.-PROGRESSING  4. Pt will have ROM seth ankle DF WFL.-PROGRESSING    LTG - 4 weeks  1. Pt will be SBA with transfers.  2. Pt will be SBA with bed mob.  3. Pt will be SBA to amb 150 ft using RW.  4. Pt will gain 1/2 muscle grade strength BLE.  5. Pt's LBP will be abolished.  6. Pt will negotiate 4" step with SBA using RW.                       Time Tracking:     PT Received On: 05/11/23  PT Start Time: 1233     PT Stop Time: 1309  PT Total Time (min): 36 min     Billable " Minutes: Gait Training 8 and Therapeutic Exercise 28    Treatment Type: Treatment  PT/PTA: PTA     Number of PTA visits since last PT visit: 1 05/11/2023

## 2023-05-11 NOTE — PLAN OF CARE
Problem: Breathing Pattern Ineffective  Goal: Effective Breathing Pattern  Outcome: Ongoing, Progressing     Problem: Gas Exchange Impaired  Goal: Optimal Gas Exchange  Outcome: Ongoing, Progressing     Problem: Adjustment to Illness COPD (Chronic Obstructive Pulmonary Disease)  Goal: Optimal Chronic Illness Coping  Outcome: Ongoing, Progressing     Problem: Functional Ability Impaired COPD (Chronic Obstructive Pulmonary Disease)  Goal: Optimal Level of Functional [place]  Outcome: Ongoing, Progressing     Problem: Infection COPD (Chronic Obstructive Pulmonary Disease)  Goal: Absence of Infection Signs and Symptoms  Outcome: Ongoing, Progressing     Problem: Oral Intake Inadequate COPD (Chronic Obstructive Pulmonary Disease)  Goal: Improved Nutrition Intake  Outcome: Ongoing, Progressing     Problem: Respiratory Compromise COPD (Chronic Obstructive Pulmonary Disease)  Goal: Effective Oxygenation and Ventilation  Outcome: Ongoing, Progressing

## 2023-05-12 NOTE — SUBJECTIVE & OBJECTIVE
Interval History: doing better with therapy, showing improvement and motivation.     Review of Systems   Respiratory:  Positive for cough. Negative for shortness of breath.    Cardiovascular:  Negative for chest pain.   Gastrointestinal:  Negative for abdominal pain, nausea and vomiting.   Neurological:  Positive for weakness.   Psychiatric/Behavioral:  Negative for agitation and confusion.    All other systems reviewed and are negative.  Objective:     Vital Signs (Most Recent):  Temp: 97.6 °F (36.4 °C) (05/12/23 0750)  Pulse: 82 (05/12/23 1033)  Resp: (!) 22 (05/12/23 1033)  BP: (!) 143/63 (05/12/23 0750)  SpO2: 98 % (05/12/23 1033) Vital Signs (24h Range):  Temp:  [97.6 °F (36.4 °C)-97.8 °F (36.6 °C)] 97.6 °F (36.4 °C)  Pulse:  [15-85] 82  Resp:  [18-25] 22  SpO2:  [92 %-98 %] 98 %  BP: (107-143)/(59-63) 143/63     Weight: 92.6 kg (204 lb 3.2 oz)  Body mass index is 32.96 kg/m².    Intake/Output Summary (Last 24 hours) at 5/12/2023 1100  Last data filed at 5/12/2023 0511  Gross per 24 hour   Intake 1100 ml   Output 1675 ml   Net -575 ml         Physical Exam  Vitals reviewed.   Constitutional:       General: He is not in acute distress.     Appearance: Normal appearance.   HENT:      Head: Normocephalic and atraumatic.   Eyes:      General: No scleral icterus.     Extraocular Movements: Extraocular movements intact.      Conjunctiva/sclera: Conjunctivae normal.      Pupils: Pupils are equal, round, and reactive to light.   Cardiovascular:      Rate and Rhythm: Normal rate and regular rhythm.      Heart sounds: No murmur heard.    No friction rub. No gallop.   Pulmonary:      Effort: Pulmonary effort is normal. No respiratory distress.      Breath sounds: No wheezing, rhonchi or rales.      Comments: Diminished bilaterally   Abdominal:      General: Abdomen is flat. Bowel sounds are normal. There is no distension.      Palpations: Abdomen is soft.      Tenderness: There is no abdominal tenderness. There is no  guarding.   Musculoskeletal:         General: No swelling.      Right lower leg: No edema.      Left lower leg: No edema.   Skin:     General: Skin is warm and dry.      Coloration: Skin is not jaundiced.      Findings: No rash.   Neurological:      General: No focal deficit present.      Mental Status: He is alert and oriented to person, place, and time.      Sensory: No sensory deficit.      Motor: Weakness present.   Psychiatric:         Mood and Affect: Mood normal.         Behavior: Behavior normal.         Thought Content: Thought content normal.         Judgment: Judgment normal.           Significant Labs: All pertinent labs within the past 24 hours have been reviewed.  Recent Lab Results         05/12/23  0600        Anion Gap 8       Baso # 0.07       Basophil % 0.6       BUN 11       BUN/CREAT RATIO 22       Calcium 8.8       Chloride 95       CO2 37       Creatinine 0.50       Differential Type Scan Smear       eGFR 103       Eos # 0.15       Eosinophil % 1.2       Glucose 133       Hematocrit 35.7       Hemoglobin 10.8       Lymph # 1.31       Lymph % 10.6       MCH 26.6       MCHC 30.3       MCV 87.9       Mono # 1.07       Mono % 8.6       MPV 9.1       Neutrophils, Abs 9.81       Neutrophils Relative 79.0       Platelets 306       Potassium 3.7       RBC 4.06       RDW 17.2       Sodium 136       WBC 12.41               Significant Imaging: I have reviewed all pertinent imaging results/findings within the past 24 hours.

## 2023-05-12 NOTE — PLAN OF CARE
Problem: Breathing Pattern Ineffective  Goal: Effective Breathing Pattern  Outcome: Ongoing, Progressing     Problem: Gas Exchange Impaired  Goal: Optimal Gas Exchange  Outcome: Ongoing, Progressing     Problem: Adjustment to Illness COPD (Chronic Obstructive Pulmonary Disease)  Goal: Optimal Chronic Illness Coping  Outcome: Ongoing, Progressing     Problem: Functional Ability Impaired COPD (Chronic Obstructive Pulmonary Disease)  Goal: Optimal Level of Functional [place]  Outcome: Ongoing, Progressing     Problem: Infection COPD (Chronic Obstructive Pulmonary Disease)  Goal: Absence of Infection Signs and Symptoms  Outcome: Ongoing, Progressing     Problem: Oral Intake Inadequate COPD (Chronic Obstructive Pulmonary Disease)  Goal: Improved Nutrition Intake  Outcome: Ongoing, Progressing     Problem: Respiratory Compromise COPD (Chronic Obstructive Pulmonary Disease)  Goal: Effective Oxygenation and Ventilation  Outcome: Ongoing, Progressing

## 2023-05-12 NOTE — PT/OT/SLP PROGRESS
"Occupational Therapy   Treatment    Name: Shaji Welsh  MRN: [id number]  Admitting Diagnosis:  Physical deconditioning       Recommendations:     Discharge Recommendations: home health PT, home health OT, home with home health  Discharge Equipment Recommendations:  3-in-1 commode, grab bar, hip kit, oxygen, walker, rolling, shower chair  Barriers to discharge:  Other (Comment) (pt reports his wife is unable to help him physically at home due to her own health problems, so he needs to be in "top" shape to go home and "do for himself.")    Assessment:     Shaji Welsh is a 82 y.o. male with a medical diagnosis of Physical deconditioning.  He presents with per CNA reports of "I wish I'd just go on and die."  Pt did not state this with OT present nor while performing OT session; however, he did seem frustrated that he had to stop session to go back to use urinal x 2 while OT session being completed.  OT assisted him back.  Pt able to perform urinal toileting I'ly after setup. Performance deficits affecting function are weakness, impaired endurance, impaired self care skills, impaired functional mobility, gait instability, impaired balance, impaired cardiopulmonary response to activity.     Rehab Prognosis:  Good; patient would benefit from acute skilled OT services to address these deficits and reach maximum level of function.       Plan:     Patient to be seen 5 x/week to address the above listed problems via self-care/home management, community/work re-entry, therapeutic exercises, therapeutic activities, wheelchair management/training  Plan of Care Expires: 05/26/23  Plan of Care Reviewed with: patient, caregiver, son    Subjective     Chief Complaint: weakness, can't walk  Patient/Family Comments/goals: return home to be household ambulator at [facility] level with AE  Pain/Comfort:   No discomfort noted.    Objective:     Communicated with: pt and his wife as well as CNA prior to session.  Patient found up in chair with " "oxygen (2L) upon OT entry to room.  CNA reports "his son told me he gets anxious when his wife is here."    General Precautions: Standard, fall, respiratory    Orthopedic Precautions:Full weight bearing  Braces: N/A  Respiratory Status: Nasal cannula, flow 2 L/min until w/c propulsion initiated with pt, then he needed to increase to 3L/min     Occupational Performance:     Bed Mobility:    Na with OT    Functional Mobility/Transfers:  Functional Mobility: w/c prop x 50 feet x 3 sets with Rest breaks needed to increased difficulty and effort required with this task    Activities of Daily Living:  Toileting: independence after setup      [facility] 6 Click ADL:      Treatment & Education:  OT engaged pt in reciprocal pulleys x 8 min for sh flexion and abduction for AROM and endurance; clothespin task taking pins off vertical board and requiring pt to lean forward and reach forward with BUE.  This was completed in entirety with max reach from seated position achieved.  He did have to leave from OT clinic x 2 to use urinal.  BLE very tight due to excessive fluid.  Pt reports he is taking lasix.    Patient left up in chair with call button in reach    GOALS:   Multidisciplinary Problems       Occupational Therapy Goals          Problem: Occupational Therapy    Goal Priority Disciplines Outcome Interventions   Occupational Therapy Goal     OT, PT/OT Ongoing, Progressing    Description: STG: within 2 weeks  Pt will perform grooming with setup and independence at sinkside from sitting or standing ONGOING/PROGRESSING  Pt will bathe with SBA with spongebathing at sinkside ONGOING/PROGRESSING  Pt will perform UE dressing with setup and independence ONGOING/PROGRESSING  Pt will perform LE dressing with SBA with AE ONGOING/PROGRESSING  Pt will sit EOB x 30 min with no assistance ONGOING/PROGRESSING  Pt will transfer bed/chair/bsc with mod I ONGOING/PROGRESSING  Pt will perform standing task x 5 min with svn assistance " ONGOING/PROGRESSING  Pt will tolerate 45 minutes of tx without fatigue ONGOING/PROGRESSING      LT.Restore to max I with self care and mobility.                          Time Tracking:     OT Date of Treatment: 23  OT Start Time: 943  OT Stop Time:   OT Total Time (min): 40 min    Billable Minutes:Self Care/Home Management 12  Therapeutic Activity 15  Therapeutic Exercise 13    OT/ROSALINA: OT          2023

## 2023-05-12 NOTE — PROGRESS NOTES
[facility] Medicine  Progress Note    Patient Name: Shaji Welsh  MRN: [id number]  Patient Class: IP- Swing   Admission Date: 5/4/2023  Length of Stay: 8 days  Attending Physician: Jean Pierre Chappell DO  Primary Care Provider: LIZETTE Taylor        Subjective:     Principal Problem:Physical deconditioning        HPI:  Mr. Welsh is a 81 y/o WM with a PMH of HTN, CHF, COPD, CAD s/p stent, and anxiety disorder who presents for admission to [facility] services. He was previously seen at [facility] on 4/5/23 with increased SOB. He was found to have CAP and CHF and treated with Lasix with no improvement of symptoms. He was then transferred to [facility] at family request for higher level of care. CTA PE protocol was performed and negative for PE. He was also evaluated for worsening left leg weakness with onset 11/22 and new onset right leg weakness. A MRI brain and spine was performed due to BLE weakness and was negative. He was started on Zosyn for pneumonia but his leukocytosis worsened. He was the seed by Infectious Disease and started on Cefdinir, Acyclovir, and Ampicillin for empiric meningitis coverage. Patient was unable to tolerate a LP. He demonstrated clinical improvement with this treatment, his leukocytosis trended down, and his oxygenation status returned to baseline. ID recommended a 14 day course of abx at discharge. Patient was evaluated by PT and OT and comprehensive inpatient rehabilitation  was recommended. Patient's daughter, who is a NP, request admission to [facility] for rehabilitation services.      Overview/Hospital Course:  5/4 Patient is sitting up in Luciano chair watching TV in [facility]. Son is at bedside. He is pleasant and denies any discomfort or sob at present. 1+ edema noted BLE. Patient encouraged to elevate lower extremities as much as possible. Patient appears upbeat about PT/OT and expresses hope it will strengthen his lower  extremities and increase his mobility and independence.    5/8 Seen today, no major issues.  Went to therapy.  Sitting in WC today.  No complaints.     5/10 Asking about Mucinex for sputum.  Will add that BID as well as Mucomyst prn.  Also discussed with patient and family about use of disrespectful language and cursing towards our staff.  Told them it will not be tolerated and next time it happens he will be discharged home with home health.  I instructed him to tell family to apologize to staff.     5/12 Trying some nebs and mucomyst for sputum relief and production.  Doing well with therapy.  Showing improvement.       Interval History: doing better with therapy, showing improvement and motivation.     Review of Systems   Respiratory:  Positive for cough. Negative for shortness of breath.    Cardiovascular:  Negative for chest pain.   Gastrointestinal:  Negative for abdominal pain, nausea and vomiting.   Neurological:  Positive for weakness.   Psychiatric/Behavioral:  Negative for agitation and confusion.    All other systems reviewed and are negative.  Objective:     Vital Signs (Most Recent):  Temp: 97.6 °F (36.4 °C) (05/12/23 0750)  Pulse: 82 (05/12/23 1033)  Resp: (!) 22 (05/12/23 1033)  BP: (!) 143/63 (05/12/23 0750)  SpO2: 98 % (05/12/23 1033) Vital Signs (24h Range):  Temp:  [97.6 °F (36.4 °C)-97.8 °F (36.6 °C)] 97.6 °F (36.4 °C)  Pulse:  [15-85] 82  Resp:  [18-25] 22  SpO2:  [92 %-98 %] 98 %  BP: (107-143)/(59-63) 143/63     Weight: 92.6 kg (204 lb 3.2 oz)  Body mass index is 32.96 kg/m².    Intake/Output Summary (Last 24 hours) at 5/12/2023 1100  Last data filed at 5/12/2023 0511  Gross per 24 hour   Intake 1100 ml   Output 1675 ml   Net -575 ml         Physical Exam  Vitals reviewed.   Constitutional:       General: He is not in acute distress.     Appearance: Normal appearance.   HENT:      Head: Normocephalic and atraumatic.   Eyes:      General: No scleral icterus.     Extraocular Movements:  Extraocular movements intact.      Conjunctiva/sclera: Conjunctivae normal.      Pupils: Pupils are equal, round, and reactive to light.   Cardiovascular:      Rate and Rhythm: Normal rate and regular rhythm.      Heart sounds: No murmur heard.    No friction rub. No gallop.   Pulmonary:      Effort: Pulmonary effort is normal. No respiratory distress.      Breath sounds: No wheezing, rhonchi or rales.      Comments: Diminished bilaterally   Abdominal:      General: Abdomen is flat. Bowel sounds are normal. There is no distension.      Palpations: Abdomen is soft.      Tenderness: There is no abdominal tenderness. There is no guarding.   Musculoskeletal:         General: No swelling.      Right lower leg: No edema.      Left lower leg: No edema.   Skin:     General: Skin is warm and dry.      Coloration: Skin is not jaundiced.      Findings: No rash.   Neurological:      General: No focal deficit present.      Mental Status: He is alert and oriented to person, place, and time.      Sensory: No sensory deficit.      Motor: Weakness present.   Psychiatric:         Mood and Affect: Mood normal.         Behavior: Behavior normal.         Thought Content: Thought content normal.         Judgment: Judgment normal.           Significant Labs: All pertinent labs within the past 24 hours have been reviewed.  Recent Lab Results         05/12/23  0600        Anion Gap 8       Baso # 0.07       Basophil % 0.6       BUN 11       BUN/CREAT RATIO 22       Calcium 8.8       Chloride 95       CO2 37       Creatinine 0.50       Differential Type Scan Smear       eGFR 103       Eos # 0.15       Eosinophil % 1.2       Glucose 133       Hematocrit 35.7       Hemoglobin 10.8       Lymph # 1.31       Lymph % 10.6       MCH 26.6       MCHC 30.3       MCV 87.9       Mono # 1.07       Mono % 8.6       MPV 9.1       Neutrophils, Abs 9.81       Neutrophils Relative 79.0       Platelets 306       Potassium 3.7       RBC 4.06       RDW 17.2        Sodium 136       WBC 12.41               Significant Imaging: I have reviewed all pertinent imaging results/findings within the past 24 hours.      Assessment/Plan:      HTN (hypertension)  Continue home meds.  Adjust as needed.       CAD (coronary artery disease)  Stable.       Chronic systolic heart failure  Continue meds, monitor weights.  Cardiac diet.       Chronic obstruct airways disease  Nebs, O2.  Monitor.         VTE Risk Mitigation (From admission, onward)         Ordered     IP VTE LOW RISK PATIENT  Once         05/04/23 1924                Discharge Planning   MARIZA:      Code Status: Full Code   Is the patient medically ready for discharge?: No    Reason for patient still in hospital (select all that apply): Patient trending condition, Treatment and PT / OT recommendations  Discharge Plan A: Home with family, Home Health                  Jean Pierre Chappell DO  Department of Hospital Medicine   [facility]

## 2023-05-12 NOTE — PT/OT/SLP PROGRESS
Physical Therapy Treatment    Patient Name:  Shaji Welsh   MRN:  [id number]    Recommendations:     Discharge Recommendations: home with home health  Discharge Equipment Recommendations: none  Barriers to discharge: Inaccessible home and Decreased caregiver support    Assessment:     Shaji Welsh is a 82 y.o. male admitted with a medical diagnosis of Physical deconditioning.  He presents with the following impairments/functional limitations: weakness, impaired endurance, impaired self care skills, impaired functional mobility, gait instability, impaired balance, decreased lower extremity function. Patient was provided with visual and verbal cues for proper form of treatment tasks. Patient required frequent rest breaks due to fatigue, and was instructed in pursed lip breathing while resting. Patient's O2 sats were monitored throughout treatment on 2L of O2, which were 94-97% prior and remained above 91% while performing therapeutic exercises. Patient with no adverse effects to treatment.     Rehab Prognosis: Fair; patient would benefit from acute skilled PT services to address these deficits and reach maximum level of function.    Recent Surgery: * No surgery found *      Plan:     During this hospitalization, patient to be seen 5 x/week to address the identified rehab impairments via gait training, therapeutic activities, therapeutic exercises and progress toward the following goals:    Plan of Care Expires:  06/02/23    Subjective     Chief Complaint: Patient stated that he had no pain this date.   Patient/Family Comments/goals: Pt to dc home with wife when more functional.  Pain/Comfort:  Pain Rating 1: 0/10      Objective:     Communicated with pt prior to session. Patient found up in chair with oxygen upon PTA entry to room.     General Precautions: Standard, fall  Orthopedic Precautions: spinal precautions (avoid trunk flexion and rotation due to back pain with LLE radiculopathy.)  Braces:    Respiratory  "Status: Nasal cannula, flow 2 L/min     Functional Mobility:  Transfers:     Sit to Stand:  moderate assistance and of 2 persons with rolling walker  Gait: Patient ambulated 20 feet with RW, min/mod A x 1 and wc trail and O2 at 3L.   Patient scooted back in wc with max A x 2.      AM-PAC 6 CLICK MOBILITY  Turning over in bed (including adjusting bedclothes, sheets and blankets)?: 3  Sitting down on and standing up from a chair with arms (e.g., wheelchair, bedside commode, etc.): 2  Moving from lying on back to sitting on the side of the bed?: 2  Moving to and from a bed to a chair (including a wheelchair)?: 3  Need to walk in hospital room?: 3  Climbing 3-5 steps with a railing?: 1  Basic Mobility Total Score: 14       Treatment & Education:  Patient performed the following exercises 20x BLE: LAQs with 1.5#, Hip ABD with red TB, Hip ADD squeezes with 3" hold, seated marching with red TB, HS curls with red TB. Sit to stand 4x to adjust seat cushion.     Patient left up in chair with towel roll at low back and with  son and wife present.    GOALS:   Multidisciplinary Problems       Physical Therapy Goals          Problem: Physical Therapy    Goal Priority Disciplines Outcome Goal Variances Interventions   Physical Therapy Goal     PT, PT/OT Ongoing, Progressing     Description: STG - 2 weeks  1. Pt will be CGA with transfers.-PROGRESSING  2. Pt will be CGA with bed mob.-PROGRESSING  3. Pt will be min A/CGA to amb 75 ft using RW.-PROGRESSING  4. Pt will have ROM seth ankle DF WFL.-PROGRESSING    LTG - 4 weeks  1. Pt will be SBA with transfers.  2. Pt will be SBA with bed mob.  3. Pt will be SBA to amb 150 ft using RW.  4. Pt will gain 1/2 muscle grade strength BLE.  5. Pt's LBP will be abolished.  6. Pt will negotiate 4" step with SBA using RW.                       Time Tracking:     PT Received On: 05/12/23  PT Start Time: 1354     PT Stop Time: 1440  PT Total Time (min): 46 min     Billable Minutes: Gait Training 10, " Therapeutic Activity 6, and Therapeutic Exercise 30    Treatment Type: Treatment  PT/PTA: PTA     Number of PTA visits since last PT visit: 2     05/12/2023

## 2023-05-13 NOTE — PLAN OF CARE
Problem: Adult Inpatient Plan of Care  Goal: Optimal Comfort and Wellbeing  5/13/2023 0339 by Marta Russ, DEMARCUS  Outcome: Ongoing, Progressing  5/12/2023 1936 by Marta Russ RN  Outcome: Ongoing, Progressing     Problem: Fall Injury Risk  Goal: Absence of Fall and Fall-Related Injury  5/13/2023 0339 by Marta Russ, DEMARCUS  Outcome: Ongoing, Progressing  5/12/2023 1936 by Marta Russ RN  Outcome: Ongoing, Progressing

## 2023-05-13 NOTE — PLAN OF CARE
Problem: Fall Injury Risk  Goal: Absence of Fall and Fall-Related Injury  5/13/2023 0427 by Marta Russ RN  Outcome: Ongoing, Progressing  5/13/2023 0339 by Marta Russ RN  Outcome: Ongoing, Progressing  5/12/2023 1936 by Marta Russ RN  Outcome: Ongoing, Progressing     Problem: Adult Inpatient Plan of Care  Goal: Optimal Comfort and Wellbeing  5/13/2023 0339 by Marta Russ RN  Outcome: Ongoing, Progressing  5/12/2023 1936 by Marta Russ RN  Outcome: Ongoing, Progressing  Goal: Readiness for Transition of Care  Outcome: Ongoing, Progressing

## 2023-05-13 NOTE — NURSING
Upon entering Mr. Welsh's room to administer PM meds found him sleeping, sitting up in  the wheel chair.  I had to shake his shoulders and call his name  a couple of times to awaken him.  He took his meds whole placed in a cup of applesauce.  Patient's son ask if I had given his dad a pain pill .  When I answered no, he said it is available and I would like him to have it.  Upon looking at the MAR, it was in fact available and was given.  He then asked when can he have more vistaril. The next available time for this is 2300.  He stated that he would like that given at that time and the patient will go to bed at that time.

## 2023-05-13 NOTE — PLAN OF CARE
Problem: Adult Inpatient Plan of Care  Goal: Plan of Care Review  Outcome: Ongoing, Progressing  Goal: Patient-Specific Goal (Individualized)  Outcome: Ongoing, Progressing  Goal: Absence of Hospital-Acquired Illness or Injury  Outcome: Ongoing, Progressing  Goal: Optimal Comfort and Wellbeing  Outcome: Ongoing, Progressing  Goal: Readiness for Transition of Care  Outcome: Ongoing, Progressing     Problem: Fall Injury Risk  Goal: Absence of Fall and Fall-Related Injury  Outcome: Ongoing, Progressing     Problem: Pain Acute  Goal: Acceptable Pain Control and Functional Ability  Outcome: Ongoing, Progressing     Problem: Fatigue  Goal: Improved Activity Tolerance  Outcome: Ongoing, Progressing     Problem: Skin Injury Risk Increased  Goal: Skin Health and Integrity  Outcome: Ongoing, Progressing     Problem: Breathing Pattern Ineffective  Goal: Effective Breathing Pattern  Outcome: Ongoing, Progressing     Problem: Gas Exchange Impaired  Goal: Optimal Gas Exchange  Outcome: Ongoing, Progressing     Problem: Adjustment to Illness COPD (Chronic Obstructive Pulmonary Disease)  Goal: Optimal Chronic Illness Coping  Outcome: Ongoing, Progressing     Problem: Functional Ability Impaired COPD (Chronic Obstructive Pulmonary Disease)  Goal: Optimal Level of Functional [place]  Outcome: Ongoing, Progressing     Problem: Infection COPD (Chronic Obstructive Pulmonary Disease)  Goal: Absence of Infection Signs and Symptoms  Outcome: Ongoing, Progressing     Problem: Oral Intake Inadequate COPD (Chronic Obstructive Pulmonary Disease)  Goal: Improved Nutrition Intake  Outcome: Ongoing, Progressing     Problem: Respiratory Compromise COPD (Chronic Obstructive Pulmonary Disease)  Goal: Effective Oxygenation and Ventilation  Outcome: Ongoing, Progressing     Problem: Impaired Wound Healing  Goal: Optimal Wound Healing  Outcome: Ongoing, Progressing

## 2023-05-13 NOTE — PLAN OF CARE
Problem: Breathing Pattern Ineffective  Goal: Effective Breathing Pattern  Outcome: Ongoing, Progressing     Problem: Gas Exchange Impaired  Goal: Optimal Gas Exchange  Outcome: Ongoing, Progressing     Problem: Adjustment to Illness COPD (Chronic Obstructive Pulmonary Disease)  Goal: Optimal Chronic Illness Coping  Outcome: Ongoing, Progressing     Problem: Functional Ability Impaired COPD (Chronic Obstructive Pulmonary Disease)  Goal: Optimal Level of Functional [place]  Outcome: Ongoing, Progressing     Problem: Infection COPD (Chronic Obstructive Pulmonary Disease)  Goal: Absence of Infection Signs and Symptoms  Outcome: Ongoing, Progressing     Problem: Respiratory Compromise COPD (Chronic Obstructive Pulmonary Disease)  Goal: Effective Oxygenation and Ventilation  Outcome: Ongoing, Progressing     Problem: Oral Intake Inadequate COPD (Chronic Obstructive Pulmonary Disease)  Goal: Improved Nutrition Intake  Outcome: Ongoing, Progressing

## 2023-05-13 NOTE — NURSING
Weighing Mr. Welsh is becoming more unsafe. He spends most of his time in a chair and is unable to safely stand for weighing.  This morning was a close call.  With his son, who is quite big, holding him on one side and the CNA on the other, his knees buckled.  The CNA yelled out I need help now.  We  were able to pull him back and into the wheel chair, avoiding a fall.  Once in the wheel chair, he became anxious, and took a few minutes to return to a somewhat calm state.

## 2023-05-14 NOTE — PLAN OF CARE
Problem: Breathing Pattern Ineffective  Goal: Effective Breathing Pattern  Outcome: Ongoing, Progressing     Problem: Gas Exchange Impaired  Goal: Optimal Gas Exchange  Outcome: Ongoing, Progressing     Problem: Adjustment to Illness COPD (Chronic Obstructive Pulmonary Disease)  Goal: Optimal Chronic Illness Coping  Outcome: Ongoing, Progressing     Problem: Functional Ability Impaired COPD (Chronic Obstructive Pulmonary Disease)  Goal: Optimal Level of Functional [place]  Outcome: Ongoing, Progressing     Problem: Infection COPD (Chronic Obstructive Pulmonary Disease)  Goal: Absence of Infection Signs and Symptoms  Outcome: Ongoing, Progressing

## 2023-05-14 NOTE — PLAN OF CARE
Problem: Breathing Pattern Ineffective  Goal: Effective Breathing Pattern  Outcome: Ongoing, Progressing     Problem: Gas Exchange Impaired  Goal: Optimal Gas Exchange  Outcome: Ongoing, Progressing     Problem: Adjustment to Illness COPD (Chronic Obstructive Pulmonary Disease)  Goal: Optimal Chronic Illness Coping  Outcome: Ongoing, Progressing     Problem: Functional Ability Impaired COPD (Chronic Obstructive Pulmonary Disease)  Goal: Optimal Level of Functional [place]  Outcome: Ongoing, Progressing     Problem: Infection COPD (Chronic Obstructive Pulmonary Disease)  Goal: Absence of Infection Signs and Symptoms  Outcome: Ongoing, Progressing     Problem: Oral Intake Inadequate COPD (Chronic Obstructive Pulmonary Disease)  Goal: Improved Nutrition Intake  Outcome: Ongoing, Progressing     Problem: Respiratory Compromise COPD (Chronic Obstructive Pulmonary Disease)  Goal: Effective Oxygenation and Ventilation  Outcome: Ongoing, Progressing

## 2023-05-14 NOTE — PLAN OF CARE
Problem: Adult Inpatient Plan of Care  Goal: Plan of Care Review  Outcome: Ongoing, Progressing  Goal: Patient-Specific Goal (Individualized)  Outcome: Ongoing, Progressing  Goal: Absence of Hospital-Acquired Illness or Injury  Outcome: Ongoing, Progressing  Goal: Optimal Comfort and Wellbeing  Outcome: Ongoing, Progressing  Goal: Readiness for Transition of Care  Outcome: Ongoing, Progressing     Problem: Fall Injury Risk  Goal: Absence of Fall and Fall-Related Injury  Outcome: Ongoing, Progressing     Problem: Pain Acute  Goal: Acceptable Pain Control and Functional Ability  Outcome: Ongoing, Progressing     Problem: Fatigue  Goal: Improved Activity Tolerance  Outcome: Ongoing, Progressing     Problem: Skin Injury Risk Increased  Goal: Skin Health and Integrity  Outcome: Ongoing, Progressing     Problem: Breathing Pattern Ineffective  Goal: Effective Breathing Pattern  Outcome: Ongoing, Progressing     Problem: Gas Exchange Impaired  Goal: Optimal Gas Exchange  Outcome: Ongoing, Progressing     Problem: Adjustment to Illness COPD (Chronic Obstructive Pulmonary Disease)  Goal: Optimal Chronic Illness Coping  Outcome: Ongoing, Progressing     Problem: Functional Ability Impaired COPD (Chronic Obstructive Pulmonary Disease)  Goal: Optimal Level of Functional [place]  Outcome: Ongoing, Progressing     Problem: Infection COPD (Chronic Obstructive Pulmonary Disease)  Goal: Absence of Infection Signs and Symptoms  Outcome: Ongoing, Progressing     Problem: Oral Intake Inadequate COPD (Chronic Obstructive Pulmonary Disease)  Goal: Improved Nutrition Intake  Outcome: Ongoing, Progressing     Problem: Respiratory Compromise COPD (Chronic Obstructive Pulmonary Disease)  Goal: Effective Oxygenation and Ventilation  Outcome: Ongoing, Progressing     Problem: Impaired Wound Healing  Goal: Optimal Wound Healing  Outcome: Ongoing, Progressing

## 2023-05-15 NOTE — PT/OT/SLP PROGRESS
Physical Therapy Treatment    Patient Name:  Shaji Welsh   MRN:  [id number]    Recommendations:     Discharge Recommendations: home with home health  Discharge Equipment Recommendations: none  Barriers to discharge: Inaccessible home and Decreased caregiver support    Assessment:     Shaji Welsh is a 82 y.o. male admitted with a medical diagnosis of Physical deconditioning.  He presents with the following impairments/functional limitations: weakness, impaired endurance, impaired self care skills, impaired functional mobility, gait instability, impaired balance, decreased lower extremity function. Patient was provided with visual and verbal cues for proper form of treatment tasks. Patient required frequent rest breaks due to fatigue, and was instructed in pursed lip breathing while resting. Patient's O2 sats were monitored throughout treatment, which were 97% prior and remained above 94%. Patient with no adverse effects to treatment.     Rehab Prognosis: Fair; patient would benefit from acute skilled PT services to address these deficits and reach maximum level of function.    Recent Surgery: * No surgery found *      Plan:     During this hospitalization, patient to be seen 5 x/week to address the identified rehab impairments via gait training, therapeutic activities, therapeutic exercises and progress toward the following goals:    Plan of Care Expires:  06/02/23    Subjective     Chief Complaint: Patient reported fatigue and weakness this date   Patient/Family Comments/goals: Pt to dc home with wife when more functional.  Pain/Comfort:  Pain Rating 1: 1/10  Location - Side 1: Bilateral  Location - Orientation 1: generalized  Location 1: other (see comments) (bottom)  Pain Addressed 1: Reposition      Objective:     Communicated with pt prior to session. Patient found up in chair with oxygen upon PTA entry to room.     General Precautions: Standard, fall  Orthopedic Precautions: spinal precautions (avoid trunk  "flexion and rotation due to back pain with LLE radiculopathy.)  Braces:    Respiratory Status: Nasal cannula, flow 2 L/min     Functional Mobility:  Transfers:     Sit to Stand:  moderate assistance and of 2 persons with rolling walker  Gait: Patient ambulated 6 feet then took a seated rest breaks, then another 5 feet with RW, min/mod A x 2 and wc trail and O2 at 3L.   Patient scooted back in wc with SBA and verbal cues for proper UE and LE use      AM-PAC 6 CLICK MOBILITY  Turning over in bed (including adjusting bedclothes, sheets and blankets)?: 3  Sitting down on and standing up from a chair with arms (e.g., wheelchair, bedside commode, etc.): 2  Moving from lying on back to sitting on the side of the bed?: 2  Moving to and from a bed to a chair (including a wheelchair)?: 3  Need to walk in hospital room?: 3  Climbing 3-5 steps with a railing?: 1  Basic Mobility Total Score: 14       Treatment & Education:  Patient performed the following exercises 15x BLE: Hip ABD with red TB, Hip ADD squeezes with 3" hold, seated marching with min A on LLE    Patient left up in chair with towel roll at low back and with  wife present.    GOALS:   Multidisciplinary Problems       Physical Therapy Goals          Problem: Physical Therapy    Goal Priority Disciplines Outcome Goal Variances Interventions   Physical Therapy Goal     PT, PT/OT Ongoing, Progressing     Description: STG - 2 weeks  1. Pt will be CGA with transfers.-PROGRESSING  2. Pt will be CGA with bed mob.-PROGRESSING  3. Pt will be min A/CGA to amb 30 ft using RW.-PROGRESSING  4. Pt will have ROM seth ankle DF WFL.-PROGRESSING    LTG - 4 weeks  1. Pt will be SBA with transfers.  2. Pt will be SBA with bed mob.  3. Pt will be SBA to amb 50 ft using RW.  4. Pt will gain 1/2 muscle grade strength BLE.  5. Pt's LBP will be abolished.  6. Pt will negotiate 4" step with SBA using RW.                       Time Tracking:     PT Received On: 05/15/23  PT Start Time: 1508   "   PT Stop Time: 1532  PT Total Time (min): 24 min     Billable Minutes: Gait Training 8, Therapeutic Activity 1, and Therapeutic Exercise 15    Treatment Type: Treatment  PT/PTA: PTA     Number of PTA visits since last PT visit: 3     05/15/2023

## 2023-05-15 NOTE — PLAN OF CARE
"  Problem: Physical Therapy  Goal: Physical Therapy Goal  Description: STG - 2 weeks  1. Pt will be CGA with transfers.-PROGRESSING  2. Pt will be CGA with bed mob.-PROGRESSING  3. Pt will be min A/CGA to amb 30 ft using RW.-PROGRESSING  4. Pt will have ROM seth ankle DF WFL.-PROGRESSING    LTG - 4 weeks  1. Pt will be SBA with transfers.  2. Pt will be SBA with bed mob.  3. Pt will be SBA to amb 50 ft using RW.  4. Pt will gain 1/2 muscle grade strength BLE.  5. Pt's LBP will be abolished.  6. Pt will negotiate 4" step with SBA using RW.  Outcome: Ongoing, Progressing     "

## 2023-05-15 NOTE — PT/OT/SLP PROGRESS
"Occupational Therapy  Treatment    Shaji Welsh   MRN: [id number]   Admitting Diagnosis: Physical deconditioning    OT Date of Treatment: 05/15/23   OT Start Time: 1402  OT Stop Time: 1442  OT Total Time (min): 40 min    Billable Minutes:  Self Care/Home Management 8 and Therapeutic Exercise 32    OT/ROSALINA: OT          General Precautions: Standard, fall, respiratory  Orthopedic Precautions: Full weight bearing  Braces: N/A  Respiratory Status: Nasal cannula, flow 2 L/min         Subjective:  Communicated with pt and his wife prior to session.  No new complaints though pt states, "I had a bad weekend.  I just can't sleep in the bed because I have a panic attack if I lay back too far and can't breathe."         Objective:        Functional Mobility:  Bed Mobility: pt is usually up in w/c upon OT arrival, and he expresses no desire to get back to bed due to the above reason.       Transfers:see PT        Functional Ambulation: see PT    Activities of Daily Living:     Feeding adaptive equipment:  none     UE adaptive equipment: none     LE adaptive equipment: has long handled sponge in his room, but he does not use it.  Has to have ISAMAR hose applied, which requires assist of caregiver due to difficulty of getting them on and off.                    Bathing adaptive equipment: TBD as pt progresses    Balance:   Static Sit: FAIR+: Able to take MINIMAL challenges from all directions  Dynamic Sit: FAIR+: Maintains balance through MINIMAL excursions of active trunk motion  Static Stand: POOR: Needs MODERATE assist to maintain  Dynamic stand: POOR: Needs MOD (moderate) assist during gait    Therapeutic Activities and Exercises:  Pt completed UBE x 15 min with R/F motions with low resist.  Pt needed a few RB's throughout.  Then, OT setup pt with reciprocal pulleys to promote above head reach and endurance of BUE sh flexion to impact grooming, hygiene, dressing.  After this, pt completed gripper black set x 3 min alternating " "hands.  OT then setup pt at tabletop with towel push to promote core engagement and forward leaning to improve trunk stamina and endurance for sit to stand and static/dynamic standing completion.      Pt fatiguing at this time, so OT took him back to his room for resting, providing a chance for him to perform handwashing as well prior to completion of OT session.  Pt completes handwashing I'ly after setup.    He also had to use urinal during session x 1 while in OT gym.  Pt handled this I'ly with items setup beside him.    AM-PAC 6 CLICK ADL   How much help from another person does this patient currently need?   1 = Unable, Total/Dependent Assistance  2 = A lot, Maximum/Moderate Assistance  3 = A little, Minimum/Contact Guard/Supervision  4 = None, Modified [place]/Independent    Putting on and taking off regular lower body clothing? : 2  Bathing (including washing, rinsing, drying)?: 2  Toileting, which includes using toilet, bedpan, or urinal? : 3  Putting on and taking off regular upper body clothing?: 3  Taking care of personal grooming such as brushing teeth?: 4  Eating meals?: 4  Daily Activity Total Score: 18     AM-PAC Raw Score CMS "G-Code Modifier Level of Impairment Assistance   6 % Total / Unable   7 - 8 CM 80 - 100% Maximal Assist   9-13 CL 60 - 80% Moderate Assist   14 - 19 CK 40 - 60% Moderate Assist   20 - 22 CJ 20 - 40% Minimal Assist   23 CI 1-20% SBA / CGA   24 CH 0% Independent/ Mod I       Patient left up in chair with call button in reach and wife present    ASSESSMENT:  Shaji Welsh is a 82 y.o. male with a medical diagnosis of Physical deconditioning and presents with complaints of not feeling well today, nursing aware..    Rehab identified problem list/impairments:  weakness, impaired endurance, impaired self care skills, impaired functional mobility, gait instability, impaired balance, impaired cardiopulmonary response to activity    Rehab potential is fair.    Activity " "tolerance: Fair    Discharge recommendations: home health PT, home health OT, home with home health   Barriers to discharge: Barriers to Discharge: Other (Comment) (pt reports his wife is unable to help him physically at home due to her own health problems, so he needs to be in "top" shape to go home and "do for himself.")    Equipment recommendations: 3-in-1 commode, grab bar, hip kit, oxygen, walker, rolling, shower chair    GOALS:   Multidisciplinary Problems       Occupational Therapy Goals          Problem: Occupational Therapy    Goal Priority Disciplines Outcome Interventions   Occupational Therapy Goal     OT, PT/OT Ongoing, Not Progressing    Description: STG: within 2 weeks  Pt will perform grooming with setup and independence at sinkside from sitting or standing MET  Pt will bathe with SBA with spongebathing at sinkside ONGOING/ NOT PROGRESSING  Pt will perform UE dressing with setup and independence ONGOING/NOT PROGRESSING  Pt will perform LE dressing with SBA with AE ONGOING/ NOT PROGRESSING  Pt will sit EOB x 30 min with no assistance ONGOING/ NOT PROGRESSING  Pt will transfer bed/chair/bsc with mod I ONGOING/NOT PROGRESSING WELL  Pt will perform standing task x 5 min with svn assistance ONGOING/NOT PROGRESSING WELL  Pt will tolerate 45 minutes of tx without fatigue ONGOING/NOT PROGRESSING AS well as expected      LT.Restore to max I with self care and mobility.                          Plan:  Patient to be seen 5 x/week to address the above listed problems via self-care/home management, community/work re-entry, therapeutic exercises, therapeutic activities, wheelchair management/training  Plan of Care expires: 23  Plan of Care reviewed with: patient, caregiver, son         05/15/2023  "

## 2023-05-15 NOTE — PLAN OF CARE
Problem: Fall Injury Risk  Goal: Absence of Fall and Fall-Related Injury  Outcome: Ongoing, Progressing  Intervention: Identify and Manage Contributors  Flowsheets (Taken 5/15/2023 1801)  Self-Care Promotion: BADL personal objects within reach  Medication Review/Management: medications reviewed  Intervention: Promote Injury-Free Environment  Flowsheets (Taken 5/15/2023 1801)  Safety Promotion/Fall Prevention:   Fall Risk signage in place   bedside commode chair   instructed to call staff for mobility   nonskid shoes/socks when out of bed   family to remain at bedside     Problem: Pain Acute  Goal: Acceptable Pain Control and Functional Ability  Outcome: Ongoing, Progressing  Intervention: Prevent or Manage Pain  Flowsheets (Taken 5/15/2023 1801)  Sensory Stimulation Regulation:   care clustered   television on  Bowel Elimination Promotion: adequate fluid intake promoted  Medication Review/Management: medications reviewed  Intervention: Optimize Psychosocial Wellbeing  Flowsheets (Taken 5/15/2023 1801)  Supportive Measures:   active listening utilized   self-care encouraged   relaxation techniques promoted   positive reinforcement provided  Diversional Activities: television     Problem: Skin Injury Risk Increased  Goal: Skin Health and Integrity  Outcome: Ongoing, Progressing  Intervention: Optimize Skin Protection  Flowsheets (Taken 5/15/2023 1801)  Pressure Reduction Techniques: frequent weight shift encouraged  Pressure Reduction Devices:   chair cushion utilized   feet on footrest/footstool  Skin Protection:   incontinence pads utilized   skin sealant/moisture barrier applied  Head of Bed (HOB) Positioning: (Up in chair.  Client will not lay down due to feeling smothered.) other (see comments)     Problem: Pain Acute  Goal: Acceptable Pain Control and Functional Ability  Outcome: Ongoing, Progressing  Intervention: Prevent or Manage Pain  Flowsheets (Taken 5/15/2023 1801)  Sensory Stimulation Regulation:    care clustered   television on  Bowel Elimination Promotion: adequate fluid intake promoted  Medication Review/Management: medications reviewed  Intervention: Optimize Psychosocial Wellbeing  Flowsheets (Taken 5/15/2023 1801)  Supportive Measures:   active listening utilized   self-care encouraged   relaxation techniques promoted   positive reinforcement provided  Diversional Activities: television     Problem: Skin Injury Risk Increased  Goal: Skin Health and Integrity  Outcome: Ongoing, Progressing  Intervention: Optimize Skin Protection  Flowsheets (Taken 5/15/2023 1801)  Pressure Reduction Techniques: frequent weight shift encouraged  Pressure Reduction Devices:   chair cushion utilized   feet on footrest/footstool  Skin Protection:   incontinence pads utilized   skin sealant/moisture barrier applied  Head of Bed (HOB) Positioning: (Up in chair.  Client will not lay down due to feeling smothered.) other (see comments)     Problem: Breathing Pattern Ineffective  Goal: Effective Breathing Pattern  Outcome: Ongoing, Not Progressing  Intervention: Promote Improved Breathing Pattern  Flowsheets (Taken 5/15/2023 1801)  Airway/Ventilation Management: calming measures promoted  Breathing Techniques/Airway Clearance: pursed-lip breathing encouraged  Supportive Measures:   active listening utilized   self-care encouraged   relaxation techniques promoted   positive reinforcement provided  Head of Bed (HOB) Positioning: (Up in chair.  Client will not lay down due to feeling smothered.) other (see comments)     Problem: Gas Exchange Impaired  Goal: Optimal Gas Exchange  Outcome: Ongoing, Not Progressing  Intervention: Optimize Oxygenation and Ventilation  Flowsheets (Taken 5/15/2023 1801)  Airway/Ventilation Management: calming measures promoted  Head of Bed (HOB) Positioning: (Up in chair.  Client will not lay down due to feeling smothered.) other (see comments)     Problem: Functional Ability Impaired COPD (Chronic  Obstructive Pulmonary Disease)  Goal: Optimal Level of Functional [place]  Outcome: Ongoing, Not Progressing  Intervention: Optimize Functional Ability  Flowsheets (Taken 5/15/2023 1801)  Self-Care Promotion: BADL personal objects within reach  Activity Management: Ambulated in amaral - L4  Environmental Support:   calm environment promoted   caregiver consistency promoted   personal routine supported

## 2023-05-15 NOTE — PLAN OF CARE
Problem: Breathing Pattern Ineffective  Goal: Effective Breathing Pattern  Outcome: Ongoing, Progressing     Problem: Gas Exchange Impaired  Goal: Optimal Gas Exchange  Outcome: Ongoing, Progressing     Problem: Adjustment to Illness COPD (Chronic Obstructive Pulmonary Disease)  Goal: Optimal Chronic Illness Coping  Outcome: Ongoing, Progressing     Problem: Functional Ability Impaired COPD (Chronic Obstructive Pulmonary Disease)  Goal: Optimal Level of Functional [place]  Outcome: Ongoing, Progressing     Problem: Infection COPD (Chronic Obstructive Pulmonary Disease)  Goal: Absence of Infection Signs and Symptoms  Outcome: Ongoing, Progressing     Problem: Oral Intake Inadequate COPD (Chronic Obstructive Pulmonary Disease)  Goal: Improved Nutrition Intake  Outcome: Ongoing, Progressing     Problem: Respiratory Compromise COPD (Chronic Obstructive Pulmonary Disease)  Goal: Effective Oxygenation and Ventilation  Outcome: Ongoing, Progressing

## 2023-05-15 NOTE — NURSING
After patient received night time meds, he became very anxious and demanding. Sitting up in wheel chair, he had to be readjusted 3-4 times before getting comfortable enough to rest. He gets very short of breath with the least amount of exertion. He refuses to lye in bed elijah to to much pressure on stomach making it hard for him to breathe .  The CNA and RN encouraged to get in bed due to edema in feet and legs was 4+ tight edema , but he refused to get in bed.

## 2023-05-15 NOTE — NURSING
Nurses Note -- 4 Eyes      5/14/2023   10:46 PM      Skin assessed during: Q Shift Change      [] No Altered Skin Integrity Present    []Prevention Measures Documented      [] Yes- Altered Skin Integrity Present or Discovered   [] LDA Added if Not in Epic (Describe Wound)   [x] New Altered Skin Integrity was Present on Admit and Documented in LDA   [] Wound Image Taken    Wound Care Consulted? Yes    Attending Nurse:  Jeanne Ace RN     Second RN/Staff Member:  Sandra Abdul RN

## 2023-05-15 NOTE — PLAN OF CARE
Problem: Occupational Therapy  Goal: Occupational Therapy Goal  Description: STG: within 2 weeks  Pt will perform grooming with setup and independence at sinkside from sitting or standing MET  Pt will bathe with SBA with spongebathing at sinkside ONGOING/ NOT PROGRESSING  Pt will perform UE dressing with setup and independence ONGOING/NOT PROGRESSING  Pt will perform LE dressing with SBA with AE ONGOING/ NOT PROGRESSING  Pt will sit EOB x 30 min with no assistance ONGOING/ NOT PROGRESSING  Pt will transfer bed/chair/bsc with mod I ONGOING/NOT PROGRESSING WELL  Pt will perform standing task x 5 min with svn assistance ONGOING/NOT PROGRESSING WELL  Pt will tolerate 45 minutes of tx without fatigue ONGOING/NOT PROGRESSING AS well as expected      LT.Restore to max I with self care and mobility.     Outcome: Ongoing, Not Progressing due to panic attacks and SOB issues.

## 2023-05-15 NOTE — SUBJECTIVE & OBJECTIVE
Interval History: doing well, participating.  No SOB    Review of Systems   Respiratory:  Negative for shortness of breath.    Cardiovascular:  Negative for chest pain.   Gastrointestinal:  Negative for abdominal pain, nausea and vomiting.   Psychiatric/Behavioral:  Negative for agitation and confusion.    All other systems reviewed and are negative.  Objective:     Vital Signs (Most Recent):  Temp: 97.8 °F (36.6 °C) (05/15/23 0702)  Pulse: 82 (05/15/23 0825)  Resp: 20 (05/15/23 0825)  BP: 130/69 (05/15/23 0702)  SpO2: 95 % (05/15/23 0825) Vital Signs (24h Range):  Temp:  [97.8 °F (36.6 °C)-98.2 °F (36.8 °C)] 97.8 °F (36.6 °C)  Pulse:  [74-90] 82  Resp:  [15-22] 20  SpO2:  [92 %-100 %] 95 %  BP: (130-139)/(63-69) 130/69     Weight:  (refused witnessed by VIET Welsh)  Body mass index is 32.96 kg/m².    Intake/Output Summary (Last 24 hours) at 5/15/2023 1242  Last data filed at 5/15/2023 0634  Gross per 24 hour   Intake 600 ml   Output 1875 ml   Net -1275 ml         Physical Exam  Vitals reviewed.   Constitutional:       General: He is not in acute distress.     Appearance: Normal appearance.   HENT:      Head: Normocephalic and atraumatic.   Eyes:      General: No scleral icterus.     Extraocular Movements: Extraocular movements intact.      Conjunctiva/sclera: Conjunctivae normal.      Pupils: Pupils are equal, round, and reactive to light.   Cardiovascular:      Rate and Rhythm: Normal rate and regular rhythm.      Heart sounds: No murmur heard.    No friction rub. No gallop.   Pulmonary:      Effort: Pulmonary effort is normal. No respiratory distress.      Breath sounds: No wheezing, rhonchi or rales.      Comments: Diminished bilaterally   Abdominal:      General: Abdomen is flat. Bowel sounds are normal. There is no distension.      Palpations: Abdomen is soft.      Tenderness: There is no abdominal tenderness. There is no guarding.   Musculoskeletal:         General: No swelling.      Right lower leg: No  edema.      Left lower leg: No edema.   Skin:     General: Skin is warm and dry.      Coloration: Skin is not jaundiced.      Findings: No rash.   Neurological:      General: No focal deficit present.      Mental Status: He is alert and oriented to person, place, and time.      Sensory: No sensory deficit.      Motor: Weakness present.   Psychiatric:         Mood and Affect: Mood normal.         Behavior: Behavior normal.         Thought Content: Thought content normal.         Judgment: Judgment normal.           Significant Labs: All pertinent labs within the past 24 hours have been reviewed.  Recent Lab Results       None            Significant Imaging: I have reviewed all pertinent imaging results/findings within the past 24 hours.

## 2023-05-15 NOTE — PROGRESS NOTES
[facility] Medicine  Progress Note    Patient Name: Shaji Welsh  MRN: [id number]  Patient Class: IP- Swing   Admission Date: 5/4/2023  Length of Stay: 11 days  Attending Physician: Jean Pierre Chappell DO  Primary Care Provider: LIZETTE Taylor        Subjective:     Principal Problem:Physical deconditioning        HPI:  Mr. Welsh is a 83 y/o WM with a PMH of HTN, CHF, COPD, CAD s/p stent, and anxiety disorder who presents for admission to [facility] services. He was previously seen at [facility] on 4/5/23 with increased SOB. He was found to have CAP and CHF and treated with Lasix with no improvement of symptoms. He was then transferred to [facility] at family request for higher level of care. CTA PE protocol was performed and negative for PE. He was also evaluated for worsening left leg weakness with onset 11/22 and new onset right leg weakness. A MRI brain and spine was performed due to BLE weakness and was negative. He was started on Zosyn for pneumonia but his leukocytosis worsened. He was the seed by Infectious Disease and started on Cefdinir, Acyclovir, and Ampicillin for empiric meningitis coverage. Patient was unable to tolerate a LP. He demonstrated clinical improvement with this treatment, his leukocytosis trended down, and his oxygenation status returned to baseline. ID recommended a 14 day course of abx at discharge. Patient was evaluated by PT and OT and comprehensive inpatient rehabilitation  was recommended. Patient's daughter, who is a NP, request admission to [facility] for rehabilitation services.      Overview/Hospital Course:  5/4 Patient is sitting up in Luciano chair watching TV in [facility]. Son is at bedside. He is pleasant and denies any discomfort or sob at present. 1+ edema noted BLE. Patient encouraged to elevate lower extremities as much as possible. Patient appears upbeat about PT/OT and expresses hope it will strengthen his lower  extremities and increase his mobility and independence.    5/8 Seen today, no major issues.  Went to therapy.  Sitting in WC today.  No complaints.     5/10 Asking about Mucinex for sputum.  Will add that BID as well as Mucomyst prn.  Also discussed with patient and family about use of disrespectful language and cursing towards our staff.  Told them it will not be tolerated and next time it happens he will be discharged home with home health.  I instructed him to tell family to apologize to staff.     5/12 Trying some nebs and mucomyst for sputum relief and production.  Doing well with therapy.  Showing improvement.     5/15 Seems better today, feels better. No major issues. Feels like he is getting stronger.       Interval History: doing well, participating.  No SOB    Review of Systems   Respiratory:  Negative for shortness of breath.    Cardiovascular:  Negative for chest pain.   Gastrointestinal:  Negative for abdominal pain, nausea and vomiting.   Psychiatric/Behavioral:  Negative for agitation and confusion.    All other systems reviewed and are negative.  Objective:     Vital Signs (Most Recent):  Temp: 97.8 °F (36.6 °C) (05/15/23 0702)  Pulse: 82 (05/15/23 0825)  Resp: 20 (05/15/23 0825)  BP: 130/69 (05/15/23 0702)  SpO2: 95 % (05/15/23 0825) Vital Signs (24h Range):  Temp:  [97.8 °F (36.6 °C)-98.2 °F (36.8 °C)] 97.8 °F (36.6 °C)  Pulse:  [74-90] 82  Resp:  [15-22] 20  SpO2:  [92 %-100 %] 95 %  BP: (130-139)/(63-69) 130/69     Weight:  (refused witnessed by VIET Welsh)  Body mass index is 32.96 kg/m².    Intake/Output Summary (Last 24 hours) at 5/15/2023 1242  Last data filed at 5/15/2023 0634  Gross per 24 hour   Intake 600 ml   Output 1875 ml   Net -1275 ml         Physical Exam  Vitals reviewed.   Constitutional:       General: He is not in acute distress.     Appearance: Normal appearance.   HENT:      Head: Normocephalic and atraumatic.   Eyes:      General: No scleral icterus.     Extraocular  Movements: Extraocular movements intact.      Conjunctiva/sclera: Conjunctivae normal.      Pupils: Pupils are equal, round, and reactive to light.   Cardiovascular:      Rate and Rhythm: Normal rate and regular rhythm.      Heart sounds: No murmur heard.    No friction rub. No gallop.   Pulmonary:      Effort: Pulmonary effort is normal. No respiratory distress.      Breath sounds: No wheezing, rhonchi or rales.      Comments: Diminished bilaterally   Abdominal:      General: Abdomen is flat. Bowel sounds are normal. There is no distension.      Palpations: Abdomen is soft.      Tenderness: There is no abdominal tenderness. There is no guarding.   Musculoskeletal:         General: No swelling.      Right lower leg: No edema.      Left lower leg: No edema.   Skin:     General: Skin is warm and dry.      Coloration: Skin is not jaundiced.      Findings: No rash.   Neurological:      General: No focal deficit present.      Mental Status: He is alert and oriented to person, place, and time.      Sensory: No sensory deficit.      Motor: Weakness present.   Psychiatric:         Mood and Affect: Mood normal.         Behavior: Behavior normal.         Thought Content: Thought content normal.         Judgment: Judgment normal.           Significant Labs: All pertinent labs within the past 24 hours have been reviewed.  Recent Lab Results       None            Significant Imaging: I have reviewed all pertinent imaging results/findings within the past 24 hours.      Assessment/Plan:      HTN (hypertension)  Continue home meds.  Adjust as needed.       CAD (coronary artery disease)  Stable.       Chronic systolic heart failure  Continue meds, monitor weights.  Cardiac diet.       Chronic obstruct airways disease  Nebs, O2.  Monitor.         VTE Risk Mitigation (From admission, onward)         Ordered     IP VTE LOW RISK PATIENT  Once         05/04/23 1924                Discharge Planning   MARIZA:      Code Status: Full Code   Is  the patient medically ready for discharge?: No    Reason for patient still in hospital (select all that apply): Patient trending condition and PT / OT recommendations  Discharge Plan A: Home with family, Home Health                  Jean Pierre Chappell DO  Department of Hospital Medicine   [facility]

## 2023-05-16 NOTE — CONSULTS
[facility]    Clinical Nutrition Follow          Date: 5/16/2023 11:20 AM    Consult received re: Assess/educate regarding nutritional needs     Shaji Welsh is a 82 y.o. male with   Active Hospital Problems    Diagnosis  POA    *Physical deconditioning [R53.81]  Yes    Weakness of both lower extremities [R29.898]  Yes    Chronic obstruct airways disease [J44.9]  Yes    Chronic systolic heart failure [I50.22]  Yes    CAD (coronary artery disease) [I25.10]  Yes    HTN (hypertension) [I10]  Yes      Resolved Hospital Problems   No resolved problems to display.       PMH:  has a past medical history of CHF (congestive heart failure), COPD (chronic obstructive pulmonary disease), and Coronary artery disease.    Nutrition/Diet History  Spiritual, Cultural Beliefs, Advent Practices, Values that Affect Care: no    Diet Order: Diet Cardiac  Oral Supplements: none     5/9: Fair appetite. Po intake ranging from 25%-100%.   5/16: Good appetite. Eating % meals.     Anthropometrics:   Height: 5' 6" (167.6 cm)  Weight (5/9/23): 91.1 kg (200 lb 14.4 oz)  IBW: 142 lbs   BMI (Calculated): 32.4  BMI Classification: Obese    5/12/23: 92.6 kg   5/13/23 - 5/16/23: Refused weights    Labs:   No results for input(s): NA, K, BUN, CREATININE, GLU, CALCIUM, ALBUMIN, CL, ALT, AST, PHOS in the last 72 hours.  Last A1c: No results found for: HGBA1C  Lab Results   Component Value Date    RBC 4.06 (L) 05/12/2023    HGB 10.8 (L) 05/12/2023    HCT 35.7 (L) 05/12/2023    MCV 87.9 05/12/2023    MCH 26.6 (L) 05/12/2023    MCHC 30.3 (L) 05/12/2023       Meds:   Scheduled Meds:   aspirin  81 mg Oral Daily    atorvastatin  20 mg Oral QHS    calcium-vitamin D3  1 tablet Oral Daily    clopidogreL  75 mg Oral Daily    diltiaZEM  240 mg Oral Daily    fluticasone-salmeterol 250-50 mcg/dose  1 puff Inhalation BID    furosemide  40 mg Oral Daily    guaiFENesin  600 mg Oral BID    potassium chloride SA  20 mEq Oral " Daily    sertraline  50 mg Oral QHS    zinc oxide   Topical (Top) Daily     Continuous Infusions: none   PRN Meds:.acetaminophen, acetylcysteine 200 mg/ml (20%), albuterol sulfate, calcium carbonate, hydrOXYzine pamoate, melatonin, senna-docusate 8.6-50 mg, traMADoL    Physical Review:  General: glasses at bedside, 2L NC   Abd/GI: rounded, obese   Last Bowel Movement: 05/15/23  Ext:  +3-+4 generalized edema   Skin: incontinence associated dermatitis to midline buttocks    Bran Score:   Bran Risk Assessment  Sensory Perception: 4-->no impairment  Moisture: 3-->occasionally moist  Activity: 2-->chairfast  Mobility: 2-->very limited  Nutrition: 2-->probably inadequate  Friction and Shear: 2-->potential problem  Bran Score: 15    Malnutrition Screening Tool  Have you recently lost weight without trying?: No  Weight loss score: 0  Have you been eating poorly because of a decreased appetite?: No  Appetite score: 0  MST Score: 0    Estimated Nutrition Needs:   18-22 kcal/[id number] kcal/day  1.2-1.5 g pro/kg IBW: 78-98 g protein/day   7633-3954 ml fluid/day      Recommendations/ Intervention:  Continue cardiac diet as tolerated    Encourage po intake and fluids    Daily weights    RD to monitor po intake,wt,nutrition-related labs, skin changes, and meds     Nutrition Risk: moderate    Signed  Dominga Jimenez, MS, RD, LD  Available via [employer]

## 2023-05-16 NOTE — PT/OT/SLP PROGRESS
"Occupational Therapy   Treatment    Name: Shaji Welsh  MRN: [id number]  Admitting Diagnosis:  Physical deconditioning       Recommendations:     Discharge Recommendations: home health PT, home health OT, home with home health  Discharge Equipment Recommendations:  3-in-1 commode, grab bar, hip kit, oxygen, walker, rolling, shower chair  Barriers to discharge:  Other (Comment) (pt reports his wife is unable to help him physically at home due to her own health problems, so he needs to be in "top" shape to go home and "do for himself.")    Assessment:     Shaji Welsh is a 82 y.o. male with a medical diagnosis of Physical deconditioning.  He presents with no new complaints, feeling pretty good today. Performance deficits affecting function are weakness, impaired endurance, impaired self care skills, impaired functional mobility, gait instability, impaired balance, impaired cardiopulmonary response to activity.     Pt wife asking, "have you seen his sock helper and reacher? We have his long sponge.  Maybe Jr. Put it in the truck.  We will look for it."  OT encouraged them to find it, and if not, will distribute another if needed for pt to begin using for LB self dressing.    Rehab Prognosis:  Good; patient would benefit from acute skilled OT services to address these deficits and reach maximum level of function.       Plan:     Patient to be seen 5 x/week to address the above listed problems via self-care/home management, community/work re-entry, therapeutic exercises, therapeutic activities, wheelchair management/training  Plan of Care Expires: 05/26/23  Plan of Care Reviewed with: patient, caregiver, son    Subjective     Chief Complaint: fatigue and SOB  Patient/Family Comments/goals: return home to SBA level with all functional mobility and self care  Pain/Comfort:   No discomfort related today to OT from pt    Objective:     Communicated with: pt and his wife prior to session.  Patient found up in chair with " oxygen (2L) upon OT entry to room.    General Precautions: Standard, fall, respiratory    Orthopedic Precautions:Full weight bearing  Braces: N/A  Respiratory Status: Nasal cannula, flow 2 L/min     Occupational Performance:     Bed Mobility:    Pt in w/c upon OT arrival    Functional Mobility/Transfers:  See PT    Overall mod a with  LB ADL self care at this time; min a UB self care with SVN only needed for grooming and hygiene      [facility] 6 Click ADL:      Treatment & Education:  OT engaged pt in Ube x 15 min for endurance training with some reach component incorporated (shoulder level reach).  Then, pt completed reciprocal pulleys for improving AROM and endurance above head x 5 min.  Finally, pt completed green putty small object find for FM and pinch strength training to improve use of AE for LB self care.    OT had to retrieve pt urinal for him during session once again for pt to eliminate bladder.  He completed I'ly after providing urinal for him.    Patient left up in chair with call button in reach and wife present    GOALS:   Multidisciplinary Problems       Occupational Therapy Goals          Problem: Occupational Therapy    Goal Priority Disciplines Outcome Interventions   Occupational Therapy Goal     OT, PT/OT Ongoing, Not Progressing    Description: STG: within 2 weeks  Pt will perform grooming with setup and independence at sinkside from sitting or standing MET  Pt will bathe with SBA with spongebathing at sinkside ONGOING/ NOT PROGRESSING  Pt will perform UE dressing with setup and independence ONGOING/NOT PROGRESSING  Pt will perform LE dressing with SBA with AE ONGOING/ NOT PROGRESSING  Pt will sit EOB x 30 min with no assistance ONGOING/ NOT PROGRESSING  Pt will transfer bed/chair/bsc with mod I ONGOING/NOT PROGRESSING WELL  Pt will perform standing task x 5 min with svn assistance ONGOING/NOT PROGRESSING WELL  Pt will tolerate 45 minutes of tx without fatigue ONGOING/NOT PROGRESSING AS well as  expected      LT.Restore to max I with self care and mobility.                          Time Tracking:     OT Date of Treatment: 23  OT Start Time: 1000  OT Stop Time:   OT Total Time (min): 42 min    Billable Minutes:Therapeutic Activity 12  Therapeutic Exercise 30    OT/ROSALINA: OT          2023

## 2023-05-16 NOTE — PLAN OF CARE
Problem: Adult Inpatient Plan of Care  Goal: Plan of Care Review  Outcome: Ongoing, Progressing  Flowsheets (Taken 5/16/2023 1732)  Plan of Care Reviewed With: patient     Problem: Fall Injury Risk  Goal: Absence of Fall and Fall-Related Injury  Outcome: Ongoing, Progressing  Intervention: Identify and Manage Contributors  Flowsheets (Taken 5/16/2023 1732)  Self-Care Promotion: BADL personal objects within reach  Medication Review/Management: medications reviewed     Problem: Pain Acute  Goal: Acceptable Pain Control and Functional Ability  Outcome: Ongoing, Progressing  Intervention: Prevent or Manage Pain  Flowsheets (Taken 5/16/2023 1732)  Bowel Elimination Promotion:   adequate fluid intake promoted   commode/bedpan at bedside  Medication Review/Management: medications reviewed

## 2023-05-16 NOTE — PLAN OF CARE
05/16/23 1211   Discharge Reassessment   Assessment Type Discharge Planning Reassessment   Communicated MARIZA with patient/caregiver Date not available/Unable to determine   Discharge Plan A Home with family;Home Health   Post-Acute Status   Post-Acute Authorization Home Health     Patient discussed in multidisciplinary team meeting. RN reports patient is unable to lay in bed due to SOB/panic attacks when he does. PT/OT reports patient is progressing. MARIZA next week

## 2023-05-16 NOTE — PT/OT/SLP PROGRESS
Physical Therapy Treatment    Patient Name:  Shaji Welsh   MRN:  [id number]    Recommendations:     Discharge Recommendations: home with home health  Discharge Equipment Recommendations: none  Barriers to discharge: Inaccessible home and Decreased caregiver support    Assessment:     Shaji Welsh is a 82 y.o. male admitted with a medical diagnosis of Physical deconditioning.  He presents with the following impairments/functional limitations: weakness, impaired endurance, impaired self care skills, impaired functional mobility, gait instability, impaired balance, decreased lower extremity function. Patient's O2 sats were 91-94% prior to treatment. Patient requested to stop gait trial early, and reported nausea after gait training. CNA notified, and she stated that she would notify RN.     Rehab Prognosis: Fair; patient would benefit from acute skilled PT services to address these deficits and reach maximum level of function.    Recent Surgery: * No surgery found *      Plan:     During this hospitalization, patient to be seen 5 x/week to address the identified rehab impairments via gait training, therapeutic activities, therapeutic exercises and progress toward the following goals:    Plan of Care Expires:  06/02/23    Subjective     Chief Complaint: Patient reported pain in neck and nausea this date.   Patient/Family Comments/goals: Pt to dc home with wife when more functional.  Pain/Comfort:  Pain Rating 1: 8/10  Location - Side 1: Bilateral  Location - Orientation 1: generalized  Location 1: neck  Pain Addressed 1: Other (see comments) (CNA notified, and she stated that she would notify RN)      Objective:     Communicated with pt prior to session. Patient found up in chair with oxygen upon PTA entry to room.     General Precautions: Standard, fall  Orthopedic Precautions: spinal precautions (avoid trunk flexion and rotation due to back pain with LLE radiculopathy.)  Braces:    Respiratory Status: Nasal cannula,  "flow 2 L/min     Functional Mobility:  Transfers:     Sit to Stand:  moderate assistance and of 2 persons with rolling walker  Gait: Patient ambulated 6 feet then took a seated rest breaks, then another 5 feet with RW, mod A x 2 and wc trail and O2 at 3L.   Sit to stand 2x, once prior to gait, once to reposition in chair.      AM-PAC 6 CLICK MOBILITY  Turning over in bed (including adjusting bedclothes, sheets and blankets)?: 3  Sitting down on and standing up from a chair with arms (e.g., wheelchair, bedside commode, etc.): 2  Moving from lying on back to sitting on the side of the bed?: 2  Moving to and from a bed to a chair (including a wheelchair)?: 3  Need to walk in hospital room?: 3  Climbing 3-5 steps with a railing?: 1  Basic Mobility Total Score: 14       Treatment & Education:  Patient performed mobility above.    Patient left up in chair with  son present.    GOALS:   Multidisciplinary Problems       Physical Therapy Goals          Problem: Physical Therapy    Goal Priority Disciplines Outcome Goal Variances Interventions   Physical Therapy Goal     PT, PT/OT Ongoing, Progressing     Description: STG - 2 weeks  1. Pt will be CGA with transfers.-PROGRESSING  2. Pt will be CGA with bed mob.-PROGRESSING  3. Pt will be min A/CGA to amb 30 ft using RW.-PROGRESSING  4. Pt will have ROM seth ankle DF WFL.-PROGRESSING    LTG - 4 weeks  1. Pt will be SBA with transfers.  2. Pt will be SBA with bed mob.  3. Pt will be SBA to amb 50 ft using RW.  4. Pt will gain 1/2 muscle grade strength BLE.  5. Pt's LBP will be abolished.  6. Pt will negotiate 4" step with SBA using RW.                       Time Tracking:     PT Received On: 05/16/23  PT Start Time: 1258     PT Stop Time: 1310  PT Total Time (min): 12 min     Billable Minutes: Gait Training 8 and Therapeutic Activity 4    Treatment Type: Treatment  PT/PTA: PTA     Number of PTA visits since last PT visit: 4 05/16/2023  "

## 2023-05-17 NOTE — PROGRESS NOTES
Clinical Pharmacy Chart Review Note      Admit Date: 5/4/2023   LOS: 13 days       Shaji Welsh is a 82 y.o. male admitted to SNF for PT/OT after hospitalization for acute respiratory failure.    Active Hospital Problems    Diagnosis  POA    *Physical deconditioning [R53.81]  Yes    Weakness of both lower extremities [R29.898]  Yes    Chronic obstruct airways disease [J44.9]  Yes    Chronic systolic heart failure [I50.22]  Yes    CAD (coronary artery disease) [I25.10]  Yes    HTN (hypertension) [I10]  Yes      Resolved Hospital Problems   No resolved problems to display.     Review of patient's allergies indicates:  No Known Allergies  Patient Active Problem List    Diagnosis Date Noted    Physical deconditioning 05/05/2023    Weakness of both lower extremities 05/05/2023    Chronic obstruct airways disease 05/05/2023    Chronic systolic heart failure 05/05/2023    CAD (coronary artery disease) 05/05/2023    HTN (hypertension) 05/05/2023       Scheduled Meds:    aspirin  81 mg Oral Daily    atorvastatin  20 mg Oral QHS    calcium-vitamin D3  1 tablet Oral Daily    clopidogreL  75 mg Oral Daily    diltiaZEM  240 mg Oral Daily    fluticasone-salmeterol 250-50 mcg/dose  1 puff Inhalation BID    furosemide  40 mg Oral Daily    guaiFENesin  600 mg Oral BID    potassium chloride SA  20 mEq Oral Daily    sertraline  50 mg Oral QHS    zinc oxide   Topical (Top) Daily     Continuous Infusions:   PRN Meds: acetaminophen, acetylcysteine 200 mg/ml (20%), albuterol sulfate, calcium carbonate, hydrOXYzine pamoate, melatonin, senna-docusate 8.6-50 mg, traMADoL    OBJECTIVE:     Vital Signs (Last 24H)  Temp:  [98 °F (36.7 °C)-98.1 °F (36.7 °C)]   Pulse:  [79-86]   Resp:  [17-25]   BP: (149-158)/(64-70)   SpO2:  [95 %-97 %]     Laboratory:  CBC:   Recent Labs   Lab 05/12/23  0600   WBC 12.41*   RBC 4.06*   HGB 10.8*   HCT 35.7*      MCV 87.9   MCH 26.6*   MCHC 30.3*     BMP:   Recent Labs   Lab 05/12/23  0600   *   NA  136   K 3.7   CL 95*   CO2 37*   BUN 11   CREATININE 0.50*   CALCIUM 8.8     .    ASSESSMENT/PLAN:     Estimated Creatinine Clearance: 125.5 mL/min (A) (based on SCr of 0.5 mg/dL (L)).Medications reviewed, no dose adjustments needed. Weekly swing bed medication regimen review complete.  Will continue to monitor.  Gonsalo Newman, Pharm. D.  Director of Pharmacy  [facility]  [phone]  [email or web address]

## 2023-05-17 NOTE — PT/OT/SLP PROGRESS
Physical Therapy Treatment    Patient Name:  Shaji Welsh   MRN:  [id number]    Recommendations:     Discharge Recommendations: home with home health  Discharge Equipment Recommendations: none  Barriers to discharge: Inaccessible home and Decreased caregiver support    Assessment:     Shaji Welsh is a 82 y.o. male admitted with a medical diagnosis of Physical deconditioning.  He presents with the following impairments/functional limitations: weakness, impaired endurance, impaired self care skills, impaired functional mobility, gait instability, impaired balance, decreased lower extremity function. Patient required frequent rest breaks due to fatigue. Patient with no reports of pain or adverse effects after completion of treatment.     Rehab Prognosis: Fair; patient would benefit from acute skilled PT services to address these deficits and reach maximum level of function.    Recent Surgery: * No surgery found *      Plan:     During this hospitalization, patient to be seen 5 x/week to address the identified rehab impairments via gait training, therapeutic activities, therapeutic exercises and progress toward the following goals:    Plan of Care Expires:  06/02/23    Subjective     Chief Complaint: Patient stated that he had no pain prior to treatment.   Patient/Family Comments/goals: Pt to dc home with wife when more functional.  Pain/Comfort:  Pain Rating 1: 0/10      Objective:     Communicated with pt prior to session. Patient found up in chair with oxygen upon PTA entry to room.     General Precautions: Standard, fall  Orthopedic Precautions: spinal precautions (avoid trunk flexion and rotation due to back pain with LLE radiculopathy.)  Braces:    Respiratory Status: Nasal cannula, flow 2 L/min     Functional Mobility:  Transfers:     Sit to Stand:  minimum assistance, moderate assistance, and of 2 persons with rolling walker  Gait: Patient ambulated 15 feet with RW, min/CGA A x 2 and wc trail and O2 at 2L.  "  Scooting back in chair with mod/max A x1.        AM-PAC 6 CLICK MOBILITY  Turning over in bed (including adjusting bedclothes, sheets and blankets)?: 3  Sitting down on and standing up from a chair with arms (e.g., wheelchair, bedside commode, etc.): 2  Moving from lying on back to sitting on the side of the bed?: 2  Moving to and from a bed to a chair (including a wheelchair)?: 3  Need to walk in hospital room?: 3  Climbing 3-5 steps with a railing?: 1  Basic Mobility Total Score: 14       Treatment & Education:  Patient performed the following therapeutic exercises 15x on BLE: Glute sets, Hip ADD squeezes with 3" hold, seated marching with red TB, HS curls with red TB, Hip ABD with red TB    Patient left up in chair with call button in reach and wife present.    GOALS:   Multidisciplinary Problems       Physical Therapy Goals          Problem: Physical Therapy    Goal Priority Disciplines Outcome Goal Variances Interventions   Physical Therapy Goal     PT, PT/OT Ongoing, Progressing     Description: STG - 2 weeks  1. Pt will be CGA with transfers.-PROGRESSING  2. Pt will be CGA with bed mob.-PROGRESSING  3. Pt will be min A/CGA to amb 30 ft using RW.-PROGRESSING  4. Pt will have ROM seth ankle DF WFL.-PROGRESSING    LTG - 4 weeks  1. Pt will be SBA with transfers.  2. Pt will be SBA with bed mob.  3. Pt will be SBA to amb 50 ft using RW.  4. Pt will gain 1/2 muscle grade strength BLE.  5. Pt's LBP will be abolished.  6. Pt will negotiate 4" step with SBA using RW.                       Time Tracking:     PT Received On: 05/17/23  PT Start Time: 1250     PT Stop Time: 1320  PT Total Time (min): 30 min     Billable Minutes: Gait Training 8 and Therapeutic Exercise 22    Treatment Type: Treatment  PT/PTA: PTA     Number of PTA visits since last PT visit: 5 05/17/2023  "

## 2023-05-17 NOTE — SUBJECTIVE & OBJECTIVE
Interval History: doing about the same, participating.  Discussed NH placement.     Review of Systems   Respiratory:  Negative for shortness of breath.    Cardiovascular:  Negative for chest pain.   Gastrointestinal:  Negative for abdominal pain, nausea and vomiting.   Neurological:  Positive for weakness.   Psychiatric/Behavioral:  Negative for agitation and confusion.    All other systems reviewed and are negative.  Objective:     Vital Signs (Most Recent):  Temp: 98.1 °F (36.7 °C) (05/17/23 0802)  Pulse: 85 (05/17/23 0802)  Resp: 20 (05/17/23 0802)  BP: (!) 158/70 (05/17/23 0802)  SpO2: 95 % (05/17/23 0802) Vital Signs (24h Range):  Temp:  [98 °F (36.7 °C)-98.1 °F (36.7 °C)] 98.1 °F (36.7 °C)  Pulse:  [79-86] 85  Resp:  [17-25] 20  SpO2:  [95 %-97 %] 95 %  BP: (149-158)/(64-70) 158/70     Weight:  (Patient refuses to weigh on standing scales this am. will get day shift to weight when going to PT.)  Body mass index is 32.96 kg/m².    Intake/Output Summary (Last 24 hours) at 5/17/2023 0856  Last data filed at 5/17/2023 0340  Gross per 24 hour   Intake 700 ml   Output 1400 ml   Net -700 ml         Physical Exam  Vitals reviewed.   Constitutional:       General: He is not in acute distress.     Appearance: Normal appearance.   HENT:      Head: Normocephalic and atraumatic.   Eyes:      General: No scleral icterus.     Extraocular Movements: Extraocular movements intact.      Conjunctiva/sclera: Conjunctivae normal.      Pupils: Pupils are equal, round, and reactive to light.   Cardiovascular:      Rate and Rhythm: Normal rate and regular rhythm.      Heart sounds: No murmur heard.    No friction rub. No gallop.   Pulmonary:      Effort: Pulmonary effort is normal. No respiratory distress.      Breath sounds: No wheezing, rhonchi or rales.      Comments: Diminished bilaterally   Abdominal:      General: Abdomen is flat. Bowel sounds are normal. There is no distension.      Palpations: Abdomen is soft.      Tenderness:  There is no abdominal tenderness. There is no guarding.   Musculoskeletal:         General: No swelling.      Right lower leg: No edema.      Left lower leg: No edema.   Skin:     General: Skin is warm and dry.      Coloration: Skin is not jaundiced.      Findings: No rash.   Neurological:      General: No focal deficit present.      Mental Status: He is alert and oriented to person, place, and time.      Sensory: No sensory deficit.      Motor: Weakness present.   Psychiatric:         Mood and Affect: Mood normal.         Behavior: Behavior normal.         Thought Content: Thought content normal.         Judgment: Judgment normal.           Significant Labs: All pertinent labs within the past 24 hours have been reviewed.  Recent Lab Results       None            Significant Imaging: I have reviewed all pertinent imaging results/findings within the past 24 hours.

## 2023-05-17 NOTE — NURSING
Patient was ordered daily weight, but he will not lay in bed to get bed weight and refuses to get on standing scales in the mornings. Will relay these concerns to day nurses.

## 2023-05-17 NOTE — PROGRESS NOTES
[facility] Medicine  Progress Note    Patient Name: Shaji Welsh  MRN: [id number]  Patient Class: IP- Swing   Admission Date: 5/4/2023  Length of Stay: 13 days  Attending Physician: Jean Pierre Chappell DO  Primary Care Provider: LIZETTE Taylor        Subjective:     Principal Problem:Physical deconditioning        HPI:  Mr. Welsh is a 83 y/o WM with a PMH of HTN, CHF, COPD, CAD s/p stent, and anxiety disorder who presents for admission to [facility]ing [facility] services. He was previously seen at [facility] on 4/5/23 with increased SOB. He was found to have CAP and CHF and treated with Lasix with no improvement of symptoms. He was then transferred to [facility] at family request for higher level of care. CTA PE protocol was performed and negative for PE. He was also evaluated for worsening left leg weakness with onset 11/22 and new onset right leg weakness. A MRI brain and spine was performed due to BLE weakness and was negative. He was started on Zosyn for pneumonia but his leukocytosis worsened. He was the seed by Infectious Disease and started on Cefdinir, Acyclovir, and Ampicillin for empiric meningitis coverage. Patient was unable to tolerate a LP. He demonstrated clinical improvement with this treatment, his leukocytosis trended down, and his oxygenation status returned to baseline. ID recommended a 14 day course of abx at discharge. Patient was evaluated by PT and OT and comprehensive inpatient rehabilitation  was recommended. Patient's daughter, who is a NP, request admission to [facility] for rehabilitation services.      Overview/Hospital Course:  5/4 Patient is sitting up in Luciano chair watching TV in [facility]. Son is at bedside. He is pleasant and denies any discomfort or sob at present. 1+ edema noted BLE. Patient encouraged to elevate lower extremities as much as possible. Patient appears upbeat about PT/OT and expresses hope it will strengthen his lower  extremities and increase his mobility and independence.    5/8 Seen today, no major issues.  Went to therapy.  Sitting in WC today.  No complaints.     5/10 Asking about Mucinex for sputum.  Will add that BID as well as Mucomyst prn.  Also discussed with patient and family about use of disrespectful language and cursing towards our staff.  Told them it will not be tolerated and next time it happens he will be discharged home with home health.  I instructed him to tell family to apologize to staff.     5/12 Trying some nebs and mucomyst for sputum relief and production.  Doing well with therapy.  Showing improvement.     5/15 Seems better today, feels better. No major issues. Feels like he is getting stronger.     5/17 [facility].  Will refer.  Continues to do therapy.       Interval History: doing about the same, participating.  Discussed NH placement.     Review of Systems   Respiratory:  Negative for shortness of breath.    Cardiovascular:  Negative for chest pain.   Gastrointestinal:  Negative for abdominal pain, nausea and vomiting.   Neurological:  Positive for weakness.   Psychiatric/Behavioral:  Negative for agitation and confusion.    All other systems reviewed and are negative.  Objective:     Vital Signs (Most Recent):  Temp: 98.1 °F (36.7 °C) (05/17/23 0802)  Pulse: 85 (05/17/23 0802)  Resp: 20 (05/17/23 0802)  BP: (!) 158/70 (05/17/23 0802)  SpO2: 95 % (05/17/23 0802) Vital Signs (24h Range):  Temp:  [98 °F (36.7 °C)-98.1 °F (36.7 °C)] 98.1 °F (36.7 °C)  Pulse:  [79-86] 85  Resp:  [17-25] 20  SpO2:  [95 %-97 %] 95 %  BP: (149-158)/(64-70) 158/70     Weight:  (Patient refuses to weigh on standing scales this am. will get day shift to weight when going to PT.)  Body mass index is 32.96 kg/m².    Intake/Output Summary (Last 24 hours) at 5/17/2023 0819  Last data filed at 5/17/2023 0340  Gross per 24 hour   Intake 700 ml   Output 1400 ml   Net -700 ml         Physical Exam  Vitals  reviewed.   Constitutional:       General: He is not in acute distress.     Appearance: Normal appearance.   HENT:      Head: Normocephalic and atraumatic.   Eyes:      General: No scleral icterus.     Extraocular Movements: Extraocular movements intact.      Conjunctiva/sclera: Conjunctivae normal.      Pupils: Pupils are equal, round, and reactive to light.   Cardiovascular:      Rate and Rhythm: Normal rate and regular rhythm.      Heart sounds: No murmur heard.    No friction rub. No gallop.   Pulmonary:      Effort: Pulmonary effort is normal. No respiratory distress.      Breath sounds: No wheezing, rhonchi or rales.      Comments: Diminished bilaterally   Abdominal:      General: Abdomen is flat. Bowel sounds are normal. There is no distension.      Palpations: Abdomen is soft.      Tenderness: There is no abdominal tenderness. There is no guarding.   Musculoskeletal:         General: No swelling.      Right lower leg: No edema.      Left lower leg: No edema.   Skin:     General: Skin is warm and dry.      Coloration: Skin is not jaundiced.      Findings: No rash.   Neurological:      General: No focal deficit present.      Mental Status: He is alert and oriented to person, place, and time.      Sensory: No sensory deficit.      Motor: Weakness present.   Psychiatric:         Mood and Affect: Mood normal.         Behavior: Behavior normal.         Thought Content: Thought content normal.         Judgment: Judgment normal.           Significant Labs: All pertinent labs within the past 24 hours have been reviewed.  Recent Lab Results       None            Significant Imaging: I have reviewed all pertinent imaging results/findings within the past 24 hours.      Assessment/Plan:      HTN (hypertension)  Continue home meds.  Adjust as needed.       CAD (coronary artery disease)  Stable.       Chronic systolic heart failure  Continue meds, monitor weights.  Cardiac diet.       Chronic obstruct airways  disease  Nebs, O2.  Monitor.         VTE Risk Mitigation (From admission, onward)         Ordered     IP VTE LOW RISK PATIENT  Once         05/04/23 1924                Discharge Planning   MARIZA:      Code Status: Full Code   Is the patient medically ready for discharge?: No    Reason for patient still in hospital (select all that apply): PT / OT recommendations  Discharge Plan A: Home with family, Home Health                  Jean Pierre Chappell DO  Department of Hospital Medicine   [facility]

## 2023-05-17 NOTE — PLAN OF CARE
Problem: Breathing Pattern Ineffective  Goal: Effective Breathing Pattern  Outcome: Ongoing, Progressing     Problem: Gas Exchange Impaired  Goal: Optimal Gas Exchange  Outcome: Ongoing, Progressing     Problem: Adjustment to Illness COPD (Chronic Obstructive Pulmonary Disease)  Goal: Optimal Chronic Illness Coping  Outcome: Ongoing, Progressing     Problem: Functional Ability Impaired COPD (Chronic Obstructive Pulmonary Disease)  Goal: Optimal Level of Functional [place]  Outcome: Ongoing, Progressing     Problem: Infection COPD (Chronic Obstructive Pulmonary Disease)  Goal: Absence of Infection Signs and Symptoms  Outcome: Ongoing, Progressing     Problem: Oral Intake Inadequate COPD (Chronic Obstructive Pulmonary Disease)  Goal: Improved Nutrition Intake  Outcome: Ongoing, Progressing     Problem: Respiratory Compromise COPD (Chronic Obstructive Pulmonary Disease)  Goal: Effective Oxygenation and Ventilation  Outcome: Ongoing, Progressing

## 2023-05-17 NOTE — PLAN OF CARE
Problem: Adult Inpatient Plan of Care  Goal: Plan of Care Review  Outcome: Ongoing, Progressing  Flowsheets (Taken 5/17/2023 1820)  Plan of Care Reviewed With:   patient   spouse     Problem: Fall Injury Risk  Goal: Absence of Fall and Fall-Related Injury  Outcome: Ongoing, Progressing     Problem: Pain Acute  Goal: Acceptable Pain Control and Functional Ability  Outcome: Ongoing, Progressing  Intervention: Develop Pain Management Plan  Flowsheets (Taken 5/17/2023 1820)  Pain Management Interventions:   medication offered but refused   pillow support provided   position adjusted   care clustered

## 2023-05-17 NOTE — PT/OT/SLP PROGRESS
"Occupational Therapy   Treatment    Name: Shaji Welsh  MRN: [id number]  Admitting Diagnosis:  Physical deconditioning       Recommendations:     Discharge Recommendations: home health PT, home health OT, home with home health  Discharge Equipment Recommendations:  3-in-1 commode, grab bar, hip kit, oxygen, walker, rolling, shower chair  Barriers to discharge:  Other (Comment) (pt reports his wife is unable to help him physically at home due to her own health problems, so he needs to be in "top" shape to go home and "do for himself.")    Assessment:     Shaji Welsh is a 82 y.o. male with a medical diagnosis of Physical deconditioning.  He presents with new information that he has decided to try going to a long term care facility.  Discussed this at length with pt as well as alternatives for returning home with sitter to assist his wife.  He states he still wants to look into referring to a nursing home for long term placement due to his wife's and his older age and decline in ability to assist him with self care and functional mobility. Performance deficits affecting function are weakness, impaired endurance, impaired self care skills, impaired functional mobility, gait instability, impaired balance, impaired cardiopulmonary response to activity.     Rehab Prognosis:  Fair; patient would benefit from acute skilled OT services to address these deficits and reach maximum level of function.       Plan:     Patient to be seen 5 x/week to address the above listed problems via self-care/home management, community/work re-entry, therapeutic exercises, therapeutic activities, wheelchair management/training  Plan of Care Expires: 05/26/23  Plan of Care Reviewed with: patient, caregiver, son    Subjective     Chief Complaint: weakness, SOB, anxiety  Patient/Family Comments/goals: pt states family is in agreement with him about nursing home placement, though per family communication, it is unclear if that is accurate.  They " "seem to be encouraging him to "get better to come home."  Will follow up with family to confirm.  Physician has indicated in notes that pt wants to seek nursing home placement as well  Pain/Comfort:   None significant today    Objective:     Communicated with: pt and his daughter and his wife prior to session.  Patient found up in chair with oxygen (2L) upon OT entry to room.    General Precautions: Standard, fall, respiratory    Orthopedic Precautions:Full weight bearing  Braces: N/A  Respiratory Status: Nasal cannula, flow 2 L/min     Occupational Performance:     Bed Mobility:    Pt continues to not want to use bed due to leaning too far back and too far forward "makes me feel like I'm smothering."    Functional Mobility/Transfers:  Patient completed Sit <> Stand Transfer with minimum assistance, moderate assistance, and of 2 persons  with  rolling walker   Functional Mobility: >16 feet with assist of 2 persons at min/mod a, cueing as needed for safe advancement of B feet    Activities of Daily Living:  Lower Body Dressing: minimum assistance distributed to pt sock aid, reacher, and dressing stick for improving independence with LB dressing.  He performed one return demonstration but declined further attempts due to fatigue       [facility] 6 Click ADL:      Treatment & Education:  OT engaged pt in UBE x 15 min at low level resist in F/R motions today.  Only a couple RB's required throughout.  Then, OT setup pt with reciprocal pulleys x 10 min in sh flexion.  OT also discussed at length with pt his discharge plans, providing alternative solutions if possible.  Pt relates " As long as I still have my right mind, it's my decision to make, and I think I will eventually like being at the nursing home..my mother did.  I'd like to go to the one in [place] where my niece works."     Patient left up in chair with call button in reach and wife present; Educated wife on prognosis and decision basing facts surrounding d/c " plans.  She reported good understandingn after OT explained about need of continued progress to continue stay in swingbed vs possibility of level of care change needs as plateau in progress reached.    GOALS:   Multidisciplinary Problems       Occupational Therapy Goals          Problem: Occupational Therapy    Goal Priority Disciplines Outcome Interventions   Occupational Therapy Goal     OT, PT/OT Ongoing, Not Progressing    Description: STG: within 2 weeks  Pt will perform grooming with setup and independence at sinkside from sitting or standing MET  Pt will bathe with SBA with spongebathing at sinkside ONGOING/ NOT PROGRESSING  Pt will perform UE dressing with setup and independence ONGOING/NOT PROGRESSING  Pt will perform LE dressing with SBA with AE ONGOING/ NOT PROGRESSING  Pt will sit EOB x 30 min with no assistance ONGOING/ NOT PROGRESSING  Pt will transfer bed/chair/bsc with mod I ONGOING/NOT PROGRESSING WELL  Pt will perform standing task x 5 min with svn assistance ONGOING/NOT PROGRESSING WELL  Pt will tolerate 45 minutes of tx without fatigue ONGOING/NOT PROGRESSING AS well as expected      LT.Restore to max I with self care and mobility.                          Time Tracking:     OT Date of Treatment: 23  OT Start Time: 1415  OT Stop Time: 1508  OT Total Time (min): 53 min    Billable Minutes:Self Care/Home Management 10  Therapeutic Activity 20  Therapeutic Exercise 23    OT/ROSALINA: OT          2023

## 2023-05-17 NOTE — PLAN OF CARE
05/17/23 1141   Discharge Reassessment   Assessment Type Discharge Planning Brief Assessment   Post-Acute Status   Post-Acute Authorization Placement   Post-Acute Placement Status Referrals Sent     Patient requests NH placement because he is unable to care for himself and his wife is unable to take care of him. [facility].

## 2023-05-18 NOTE — PLAN OF CARE
Problem: Fatigue  Goal: Improved Activity Tolerance  Outcome: Ongoing, Progressing  Intervention: Promote Improved Energy  Flowsheets (Taken 5/18/2023 1521)  Fatigue Management: frequent rest breaks encouraged  Sleep/Rest Enhancement: awakenings minimized  Activity Management: Walk with assistive devise and /or staff member - L3   Plan of care reviewed with patient. Patients status ongoing progressing.

## 2023-05-18 NOTE — PT/OT/SLP PROGRESS
Physical Therapy Treatment    Patient Name:  Shaji Welsh   MRN:  [id number]    Recommendations:     Discharge Recommendations: home with home health  Discharge Equipment Recommendations: none  Barriers to discharge: Inaccessible home and Decreased caregiver support    Assessment:     Shaji Welsh is a 82 y.o. male admitted with a medical diagnosis of Physical deconditioning.  He presents with the following impairments/functional limitations: gait instability, impaired balance, weakness, impaired endurance, impaired functional mobility, decreased lower extremity function, impaired cardiopulmonary response to activity .    Pt c/o being SOB and not willing to walk despite SPO2 being 96-98% on 3L O2.    Rehab Prognosis: Fair; patient would benefit from acute skilled PT services to address these deficits and reach maximum level of function.    Recent Surgery: * No surgery found *      Plan:     During this hospitalization, patient to be seen 5 x/week to address the identified rehab impairments via gait training, therapeutic activities, therapeutic exercises and progress toward the following goals:    Plan of Care Expires:  06/02/23    Subjective     Chief Complaint: Pt c/o being SOB this afternoon and not willing to amb. He refused to let PT elevate his BLE using ELRs stating that doing so would cause him more SOB.  Patient/Family Comments/goals: PT informed that pt may possible dc to a nsg home.  Pain/Comfort:  Pain Rating 1: 0/10      Objective:     Communicated with pt/wife prior to session.  Patient found up in chair with oxygen upon PT entry to room.     General Precautions: Standard, fall, respiratory  Orthopedic Precautions: spinal precautions (avoid trunk flexion and rotation due to back pain with LLE radiculopathy.)  Braces:    Respiratory Status: Nasal cannula, flow 2 L/min     Functional Mobility:  No funtional activity performed today due to pt's c/o being SOB.      AM-PAC 6 CLICK MOBILITY          Treatment  "& Education:  NU-Step at Level 1.5 x 8 minutes while on 3L O2. Resting SPO2 96% and this increased to 97 and 98% during ther ex. Pt refused to amb so he was transported back to his room. PT fabricated a cervical roll to place behind his neck in the tall back [facility]. Pt had had a pillow behind his neck which was causing severe fwd head so it was replace with the towel roll which pt stated felt more comfortable.    Patient left up in chair with all lines intact..    GOALS:   Multidisciplinary Problems       Physical Therapy Goals          Problem: Physical Therapy    Goal Priority Disciplines Outcome Goal Variances Interventions   Physical Therapy Goal     PT, PT/OT Ongoing, Progressing     Description: STG - 2 weeks  1. Pt will be CGA with transfers.-PROGRESSING  2. Pt will be CGA with bed mob.-PROGRESSING  3. Pt will be min A/CGA to amb 30 ft using RW.-PROGRESSING  4. Pt will have ROM seth ankle DF WFL.-PROGRESSING    LTG - 4 weeks  1. Pt will be SBA with transfers.  2. Pt will be SBA with bed mob.  3. Pt will be SBA to amb 50 ft using RW.  4. Pt will gain 1/2 muscle grade strength BLE.  5. Pt's LBP will be abolished.  6. Pt will negotiate 4" step with SBA using RW.                       Time Tracking:     PT Received On: 05/18/23  PT Start Time: 1249     PT Stop Time: 1307  PT Total Time (min): 18 min     Billable Minutes: Therapeutic Activity 7, Therapeutic Exercise 11, and Total Time 18 min    Treatment Type: Treatment  PT/PTA: PT     Number of PTA visits since last PT visit: 0     05/18/2023  "

## 2023-05-18 NOTE — PLAN OF CARE
Problem: Breathing Pattern Ineffective  Goal: Effective Breathing Pattern  Outcome: Ongoing, Progressing     Problem: Gas Exchange Impaired  Goal: Optimal Gas Exchange  Outcome: Ongoing, Progressing     Problem: Adjustment to Illness COPD (Chronic Obstructive Pulmonary Disease)  Goal: Optimal Chronic Illness Coping  Outcome: Ongoing, Progressing     Problem: Functional Ability Impaired COPD (Chronic Obstructive Pulmonary Disease)  Goal: Optimal Level of Functional [place]  Outcome: Ongoing, Progressing     Problem: Infection COPD (Chronic Obstructive Pulmonary Disease)  Goal: Absence of Infection Signs and Symptoms  Outcome: Ongoing, Progressing     Problem: Oral Intake Inadequate COPD (Chronic Obstructive Pulmonary Disease)  Goal: Improved Nutrition Intake  Outcome: Ongoing, Progressing     Problem: Respiratory Compromise COPD (Chronic Obstructive Pulmonary Disease)  Goal: Effective Oxygenation and Ventilation  Outcome: Ongoing, Progressing

## 2023-05-18 NOTE — PT/OT/SLP PROGRESS
"Occupational Therapy   Treatment    Name: Shaji Welsh  MRN: [id number]  Admitting Diagnosis:  Physical deconditioning       Recommendations:     Discharge Recommendations: home health PT, home health OT, home with home health  Discharge Equipment Recommendations:  3-in-1 commode, grab bar, hip kit, oxygen, walker, rolling, shower chair  Barriers to discharge:  Other (Comment) (pt reports his wife is unable to help him physically at home due to her own health problems, so he needs to be in "top" shape to go home and "do for himself.")    Assessment:     Shaji Welsh is a 82 y.o. male with a medical diagnosis of Physical deconditioning.  He presents with not feeling well today, reporting he had trouble with his oxygen sats during PT this morning. Performance deficits affecting function are weakness, impaired endurance, impaired self care skills, impaired functional mobility, gait instability, impaired balance, impaired cardiopulmonary response to activity.     Rehab Prognosis:  Fair; patient would benefit from acute skilled OT services to address these deficits and reach maximum level of function.       Plan:     Patient to be seen 5 x/week to address the above listed problems via self-care/home management, community/work re-entry, therapeutic exercises, therapeutic activities, wheelchair management/training  Plan of Care Expires: 05/26/23  Plan of Care Reviewed with: patient, caregiver, son    Subjective     Chief Complaint: weakness, SOB  Patient/Family Comments/goals: pt is stating his desire now is to refer to long term care for permanent placement  Pain/Comfort:   Feels tired today per his report    Objective:     Communicated with: pt and his wife prior to session.  Patient found up in chair with oxygen (2L) upon OT entry to room.  Went by around 130pm to check on him for therapy, and he was dozing off, barely able to keep his eyes open, so OT came back later to see if he was feeling better, at this time 1430.  "  Continues to report not feeling well today despite oxygen sats rating 96 to 97% during therapy session with OT this afternoon.    General Precautions: Standard, fall, respiratory    Orthopedic Precautions:Full weight bearing  Braces: N/A  Respiratory Status: Nasal cannula, flow 3 L/min     Occupational Performance:       [facility] 6 Click ADL:      Treatment & Education:  OT initiated pt with UBE x 15 min with a couple short RB's, low resist, F/R motions; then, pt performed gripper black handled x 10 reps x 2 sets each hand.  Attempted towel scrubbing at tabletop, but pt reported feeling smothered while leaning forward to perform exercise.  At this time, OT took pt back to his room since he reported he was too fatigued to continue with more exercise.    Patient left up in chair with call button in reach and wife present    GOALS:   Multidisciplinary Problems       Occupational Therapy Goals          Problem: Occupational Therapy    Goal Priority Disciplines Outcome Interventions   Occupational Therapy Goal     OT, PT/OT Ongoing, Not Progressing    Description: STG: within 2 weeks  Pt will perform grooming with setup and independence at sinkside from sitting or standing MET  Pt will bathe with SBA with spongebathing at sinkside ONGOING/ NOT PROGRESSING  Pt will perform UE dressing with setup and independence ONGOING/NOT PROGRESSING  Pt will perform LE dressing with SBA with AE ONGOING/ NOT PROGRESSING  Pt will sit EOB x 30 min with no assistance ONGOING/ NOT PROGRESSING  Pt will transfer bed/chair/bsc with mod I ONGOING/NOT PROGRESSING WELL  Pt will perform standing task x 5 min with svn assistance ONGOING/NOT PROGRESSING WELL  Pt will tolerate 45 minutes of tx without fatigue ONGOING/NOT PROGRESSING AS well as expected      LT.Restore to max I with self care and mobility.                          Time Tracking:     OT Date of Treatment: 23  OT Start Time: 1430  OT Stop Time: 1500  OT Total Time (min): 30  min    Billable Minutes:Therapeutic Exercise 30    OT/ROSALINA: OT          5/18/2023

## 2023-05-18 NOTE — NURSING
Mr. Welsh refused to be weighed this morning.  He states it is too much trouble for him and too early in the morning.

## 2023-05-18 NOTE — PLAN OF CARE
Problem: Adult Inpatient Plan of Care  Goal: Absence of Hospital-Acquired Illness or Injury  Outcome: Ongoing, Progressing  Goal: Optimal Comfort and Wellbeing  Outcome: Ongoing, Progressing     Problem: Adult Inpatient Plan of Care  Goal: Optimal Comfort and Wellbeing  Outcome: Ongoing, Progressing

## 2023-05-19 PROBLEM — E87.6 HYPOKALEMIA: Status: ACTIVE | Noted: 2023-01-01

## 2023-05-19 NOTE — NURSING
We attempted to weigh Mr. Welsh again this morning with near patient fall and staff injuries.  Since Mr. Welsh is unable to be in a hospital bed, where he could be weighed each morning, he  must be stood on standard scales. It is not possible for  us to perform this task.

## 2023-05-19 NOTE — PT/OT/SLP PROGRESS
Occupational Therapy      Patient Name:  Shaji Welsh   MRN:  [id number]    Patient not seen today secondary to Other (Comment), Therapist assessment (PT notes indicate pt is unable to participate in therapy today due to not feeling well and per pt stating he could not participate today.  No OT pursued this date due to pt not feeling well and per pt request.). Will follow-up 5/22/2023; SB coordinator actively seeking referral to Long term care facility for permanent placement per pt request as well.    5/19/2023

## 2023-05-19 NOTE — PLAN OF CARE
Problem: Adult Inpatient Plan of Care  Goal: Plan of Care Review  Outcome: Ongoing, Progressing  Goal: Patient-Specific Goal (Individualized)  Outcome: Ongoing, Progressing  Goal: Absence of Hospital-Acquired Illness or Injury  Outcome: Ongoing, Progressing  Goal: Optimal Comfort and Wellbeing  Outcome: Ongoing, Progressing     Problem: Fall Injury Risk  Goal: Absence of Fall and Fall-Related Injury  Outcome: Ongoing, Progressing     Problem: Skin Injury Risk Increased  Goal: Skin Health and Integrity  Outcome: Ongoing, Progressing     Problem: Breathing Pattern Ineffective  Goal: Effective Breathing Pattern  Outcome: Ongoing, Progressing     Problem: Gas Exchange Impaired  Goal: Optimal Gas Exchange  Outcome: Ongoing, Progressing     Problem: Adjustment to Illness COPD (Chronic Obstructive Pulmonary Disease)  Goal: Optimal Chronic Illness Coping  Outcome: Ongoing, Progressing     Problem: Functional Ability Impaired COPD (Chronic Obstructive Pulmonary Disease)  Goal: Optimal Level of Functional [place]  Outcome: Ongoing, Progressing     Problem: Infection COPD (Chronic Obstructive Pulmonary Disease)  Goal: Absence of Infection Signs and Symptoms  Outcome: Ongoing, Progressing     Problem: Oral Intake Inadequate COPD (Chronic Obstructive Pulmonary Disease)  Goal: Improved Nutrition Intake  Outcome: Ongoing, Progressing     Problem: Respiratory Compromise COPD (Chronic Obstructive Pulmonary Disease)  Goal: Effective Oxygenation and Ventilation  Outcome: Ongoing, Progressing     Problem: Impaired Wound Healing  Goal: Optimal Wound Healing  Outcome: Ongoing, Progressing

## 2023-05-19 NOTE — PLAN OF CARE
Problem: Breathing Pattern Ineffective  Goal: Effective Breathing Pattern  Outcome: Ongoing, Progressing     Problem: Gas Exchange Impaired  Goal: Optimal Gas Exchange  Outcome: Ongoing, Progressing     Problem: Adjustment to Illness COPD (Chronic Obstructive Pulmonary Disease)  Goal: Optimal Chronic Illness Coping  Outcome: Ongoing, Progressing     Problem: Functional Ability Impaired COPD (Chronic Obstructive Pulmonary Disease)  Goal: Optimal Level of Functional [place]  Outcome: Ongoing, Progressing     Problem: Infection COPD (Chronic Obstructive Pulmonary Disease)  Goal: Absence of Infection Signs and Symptoms  Outcome: Ongoing, Progressing     Problem: Oral Intake Inadequate COPD (Chronic Obstructive Pulmonary Disease)  Goal: Improved Nutrition Intake  Outcome: Ongoing, Progressing     Problem: Respiratory Compromise COPD (Chronic Obstructive Pulmonary Disease)  Goal: Effective Oxygenation and Ventilation  Outcome: Ongoing, Progressing

## 2023-05-19 NOTE — PLAN OF CARE
Attempted to reach [facility] in [place], admissions coordinator away for the day. Left VM.     Attempted to reach admissions at [facility]. No answer. Will call later

## 2023-05-19 NOTE — PLAN OF CARE
Problem: Adult Inpatient Plan of Care  Goal: Plan of Care Review  Outcome: Ongoing, Progressing  Goal: Patient-Specific Goal (Individualized)  Outcome: Ongoing, Progressing  Goal: Absence of Hospital-Acquired Illness or Injury  Outcome: Ongoing, Progressing  Goal: Optimal Comfort and Wellbeing  Outcome: Ongoing, Progressing  Goal: Readiness for Transition of Care  Outcome: Ongoing, Progressing     Problem: Fall Injury Risk  Goal: Absence of Fall and Fall-Related Injury  Outcome: Ongoing, Progressing     Problem: Pain Acute  Goal: Acceptable Pain Control and Functional Ability  Outcome: Ongoing, Progressing     Problem: Fatigue  Goal: Improved Activity Tolerance  Outcome: Ongoing, Progressing     Problem: Skin Injury Risk Increased  Goal: Skin Health and Integrity  Outcome: Ongoing, Progressing     Problem: Breathing Pattern Ineffective  Goal: Effective Breathing Pattern  Outcome: Ongoing, Progressing     Problem: Gas Exchange Impaired  Goal: Optimal Gas Exchange  Outcome: Ongoing, Progressing     Problem: Adjustment to Illness COPD (Chronic Obstructive Pulmonary Disease)  Goal: Optimal Chronic Illness Coping  Outcome: Ongoing, Progressing     Problem: Functional Ability Impaired COPD (Chronic Obstructive Pulmonary Disease)  Goal: Optimal Level of Functional [place]  Outcome: Ongoing, Progressing     Problem: Infection COPD (Chronic Obstructive Pulmonary Disease)  Goal: Absence of Infection Signs and Symptoms  Outcome: Ongoing, Progressing     Problem: Oral Intake Inadequate COPD (Chronic Obstructive Pulmonary Disease)  Goal: Improved Nutrition Intake  Outcome: Ongoing, Progressing     Problem: Respiratory Compromise COPD (Chronic Obstructive Pulmonary Disease)  Goal: Effective Oxygenation and Ventilation  Outcome: Ongoing, Progressing     Problem: Impaired Wound Healing  Goal: Optimal Wound Healing  Outcome: Ongoing, Progressing

## 2023-05-19 NOTE — PT/OT/SLP PROGRESS
Physical Therapy      Patient Name:  Shaji Welsh   MRN:  [id number]    Patient not seen today secondary to Other (Comment) (patient SOB at rest up on my arrival to his room. He states he is feeling badly and unable to participate in therapy.). Will follow-up 5/22/22.

## 2023-05-19 NOTE — SUBJECTIVE & OBJECTIVE
Interval History: no major issues, working and improving some, looking at NH.    Review of Systems   Respiratory:  Positive for cough. Negative for shortness of breath.    Cardiovascular:  Negative for chest pain.   Gastrointestinal:  Negative for abdominal pain, nausea and vomiting.   Musculoskeletal:  Positive for arthralgias.   Neurological:  Positive for weakness.   Psychiatric/Behavioral:  Negative for agitation and confusion.    All other systems reviewed and are negative.  Objective:     Vital Signs (Most Recent):  Temp: 98 °F (36.7 °C) (05/19/23 0802)  Pulse: 78 (05/19/23 0825)  Resp: 20 (05/19/23 0825)  BP: (!) 141/68 (05/19/23 0802)  SpO2: 98 % (05/19/23 0825) Vital Signs (24h Range):  Temp:  [97.9 °F (36.6 °C)-98 °F (36.7 °C)] 98 °F (36.7 °C)  Pulse:  [78-84] 78  Resp:  [18-24] 20  SpO2:  [94 %-98 %] 98 %  BP: (141-142)/(58-68) 141/68     Weight:  (unable to weigh, too dangerous for patient and staff)  Body mass index is 32.96 kg/m².    Intake/Output Summary (Last 24 hours) at 5/19/2023 1215  Last data filed at 5/19/2023 1101  Gross per 24 hour   Intake 1660 ml   Output 1750 ml   Net -90 ml         Physical Exam  Vitals reviewed.   Constitutional:       General: He is not in acute distress.     Appearance: Normal appearance.   HENT:      Head: Normocephalic and atraumatic.   Eyes:      General: No scleral icterus.     Extraocular Movements: Extraocular movements intact.      Conjunctiva/sclera: Conjunctivae normal.      Pupils: Pupils are equal, round, and reactive to light.   Cardiovascular:      Rate and Rhythm: Normal rate and regular rhythm.      Heart sounds: No murmur heard.    No friction rub. No gallop.   Pulmonary:      Effort: Pulmonary effort is normal. No respiratory distress.      Breath sounds: No wheezing, rhonchi or rales.      Comments: Diminished bilaterally   Abdominal:      General: Abdomen is flat. Bowel sounds are normal. There is no distension.      Palpations: Abdomen is soft.       Tenderness: There is no abdominal tenderness. There is no guarding.   Musculoskeletal:         General: No swelling.      Right lower leg: No edema.      Left lower leg: No edema.   Skin:     General: Skin is warm and dry.      Coloration: Skin is not jaundiced.      Findings: No rash.   Neurological:      General: No focal deficit present.      Mental Status: He is alert and oriented to person, place, and time.      Sensory: No sensory deficit.      Motor: Weakness present.   Psychiatric:         Mood and Affect: Mood normal.         Behavior: Behavior normal.         Thought Content: Thought content normal.         Judgment: Judgment normal.           Significant Labs: All pertinent labs within the past 24 hours have been reviewed.  Recent Lab Results         05/19/23  0641        Anion Gap 7       Baso # 0.04       Basophil % 0.3       BUN 15       BUN/CREAT RATIO 20       Calcium 9.0       Chloride 96       CO2 36       Creatinine 0.76       Differential Type Scan Smear       eGFR 91       Eos # 0.15       Eosinophil % 1.1       Glucose 132       Hematocrit 37.1       Hemoglobin 11.2       Lymph # 1.45       Lymph % 10.4       MCH 26.5       MCHC 30.2       MCV 87.7       Mono # 1.40       Mono % 10.0       MPV 9.1       Neutrophils, Abs 10.90       Neutrophils Relative 78.2       Platelets 268       Potassium 3.1       RBC 4.23       RDW 16.7       Sodium 136       WBC 13.94               Significant Imaging: I have reviewed all pertinent imaging results/findings within the past 24 hours.

## 2023-05-20 NOTE — PLAN OF CARE
Problem: Gas Exchange Impaired  Goal: Optimal Gas Exchange  Outcome: Ongoing, Progressing     Problem: Adjustment to Illness COPD (Chronic Obstructive Pulmonary Disease)  Goal: Optimal Chronic Illness Coping  Outcome: Ongoing, Progressing

## 2023-05-21 NOTE — PLAN OF CARE
Problem: Skin Injury Risk Increased  Goal: Skin Health and Integrity  Outcome: Ongoing, Progressing     Problem: Breathing Pattern Ineffective  Goal: Effective Breathing Pattern  Outcome: Ongoing, Progressing

## 2023-05-22 NOTE — PROGRESS NOTES
[facility] Medicine  Progress Note    Patient Name: Shaji Welsh  MRN: [id number]  Patient Class: IP- Swing   Admission Date: 5/4/2023  Length of Stay: 18 days  Attending Physician: Jean Pierre Chappell DO  Primary Care Provider: LIZETTE Taylor        Subjective:     Principal Problem:Physical deconditioning        HPI:  Mr. Welsh is a 81 y/o WM with a PMH of HTN, CHF, COPD, CAD s/p stent, and anxiety disorder who presents for admission to [facility] services. He was previously seen at [facility] on 4/5/23 with increased SOB. He was found to have CAP and CHF and treated with Lasix with no improvement of symptoms. He was then transferred to [facility] at family request for higher level of care. CTA PE protocol was performed and negative for PE. He was also evaluated for worsening left leg weakness with onset 11/22 and new onset right leg weakness. A MRI brain and spine was performed due to BLE weakness and was negative. He was started on Zosyn for pneumonia but his leukocytosis worsened. He was the seed by Infectious Disease and started on Cefdinir, Acyclovir, and Ampicillin for empiric meningitis coverage. Patient was unable to tolerate a LP. He demonstrated clinical improvement with this treatment, his leukocytosis trended down, and his oxygenation status returned to baseline. ID recommended a 14 day course of abx at discharge. Patient was evaluated by PT and OT and comprehensive inpatient rehabilitation  was recommended. Patient's daughter, who is a NP, request admission to [facility] for rehabilitation services.      Overview/Hospital Course:  5/4 Patient is sitting up in Luciano chair watching TV in [facility]. Son is at bedside. He is pleasant and denies any discomfort or sob at present. 1+ edema noted BLE. Patient encouraged to elevate lower extremities as much as possible. Patient appears upbeat about PT/OT and expresses hope it will strengthen his lower  extremities and increase his mobility and independence.    5/8 Seen today, no major issues.  Went to therapy.  Sitting in WC today.  No complaints.     5/10 Asking about Mucinex for sputum.  Will add that BID as well as Mucomyst prn.  Also discussed with patient and family about use of disrespectful language and cursing towards our staff.  Told them it will not be tolerated and next time it happens he will be discharged home with home health.  I instructed him to tell family to apologize to staff.     5/12 Trying some nebs and mucomyst for sputum relief and production.  Doing well with therapy.  Showing improvement.     5/15 Seems better today, feels better. No major issues. Feels like he is getting stronger.     5/17 States he wants to look at [facility].  Will refer.  Continues to do therapy.     5/19 No major issues, K a little low this am will adjust PO K supplements.     5/22 Sleepy this am, continues therapy.  Looking at NH placement and referrals sent.       Interval History: no major issues, working and improving some, looking at NH.    Review of Systems   Respiratory:  Positive for cough. Negative for shortness of breath.    Cardiovascular:  Negative for chest pain.   Gastrointestinal:  Negative for abdominal pain, nausea and vomiting.   Musculoskeletal:  Positive for arthralgias.   Neurological:  Positive for weakness.   Psychiatric/Behavioral:  Negative for agitation and confusion.    All other systems reviewed and are negative.  Objective:     Vital Signs (Most Recent):  Temp: 98 °F (36.7 °C) (05/19/23 0802)  Pulse: 78 (05/19/23 0825)  Resp: 20 (05/19/23 0825)  BP: (!) 141/68 (05/19/23 0802)  SpO2: 98 % (05/19/23 0825) Vital Signs (24h Range):  Temp:  [97.9 °F (36.6 °C)-98 °F (36.7 °C)] 98 °F (36.7 °C)  Pulse:  [78-84] 78  Resp:  [18-24] 20  SpO2:  [94 %-98 %] 98 %  BP: (141-142)/(58-68) 141/68     Weight:  (unable to weigh, too dangerous for patient and staff)  Body mass index is 32.96  kg/m².    Intake/Output Summary (Last 24 hours) at 5/19/2023 1215  Last data filed at 5/19/2023 1101  Gross per 24 hour   Intake 1660 ml   Output 1750 ml   Net -90 ml         Physical Exam  Vitals reviewed.   Constitutional:       General: He is not in acute distress.     Appearance: Normal appearance.   HENT:      Head: Normocephalic and atraumatic.   Eyes:      General: No scleral icterus.     Extraocular Movements: Extraocular movements intact.      Conjunctiva/sclera: Conjunctivae normal.      Pupils: Pupils are equal, round, and reactive to light.   Cardiovascular:      Rate and Rhythm: Normal rate and regular rhythm.      Heart sounds: No murmur heard.    No friction rub. No gallop.   Pulmonary:      Effort: Pulmonary effort is normal. No respiratory distress.      Breath sounds: No wheezing, rhonchi or rales.      Comments: Diminished bilaterally   Abdominal:      General: Abdomen is flat. Bowel sounds are normal. There is no distension.      Palpations: Abdomen is soft.      Tenderness: There is no abdominal tenderness. There is no guarding.   Musculoskeletal:         General: No swelling.      Right lower leg: No edema.      Left lower leg: No edema.   Skin:     General: Skin is warm and dry.      Coloration: Skin is not jaundiced.      Findings: No rash.   Neurological:      General: No focal deficit present.      Mental Status: He is alert and oriented to person, place, and time.      Sensory: No sensory deficit.      Motor: Weakness present.   Psychiatric:         Mood and Affect: Mood normal.         Behavior: Behavior normal.         Thought Content: Thought content normal.         Judgment: Judgment normal.           Significant Labs: All pertinent labs within the past 24 hours have been reviewed.  Recent Lab Results         05/19/23  0641        Anion Gap 7       Baso # 0.04       Basophil % 0.3       BUN 15       BUN/CREAT RATIO 20       Calcium 9.0       Chloride 96       CO2 36       Creatinine  0.76       Differential Type Scan Smear       eGFR 91       Eos # 0.15       Eosinophil % 1.1       Glucose 132       Hematocrit 37.1       Hemoglobin 11.2       Lymph # 1.45       Lymph % 10.4       MCH 26.5       MCHC 30.2       MCV 87.7       Mono # 1.40       Mono % 10.0       MPV 9.1       Neutrophils, Abs 10.90       Neutrophils Relative 78.2       Platelets 268       Potassium 3.1       RBC 4.23       RDW 16.7       Sodium 136       WBC 13.94               Significant Imaging: I have reviewed all pertinent imaging results/findings within the past 24 hours.      Assessment/Plan:      Hypokalemia  Adjust PO K and follow.       HTN (hypertension)  Continue home meds.  Adjust as needed.       CAD (coronary artery disease)  Stable.       Chronic systolic heart failure  Continue meds, monitor weights.  Cardiac diet.       Chronic obstruct airways disease  Nebs, O2.  Monitor.         VTE Risk Mitigation (From admission, onward)         Ordered     IP VTE LOW RISK PATIENT  Once         05/04/23 1924                Discharge Planning   MARIZA:      Code Status: Full Code   Is the patient medically ready for discharge?: No    Reason for patient still in hospital (select all that apply): Patient trending condition  Discharge Plan A: Home with family, Home Health                  Jean Pierre Chappell DO  Department of Hospital Medicine   [facility]

## 2023-05-22 NOTE — PROGRESS NOTES
[facility] Medicine  Progress Note    Patient Name: Shaji Welsh  MRN: [id number]  Patient Class: IP- Swing   Admission Date: 5/4/2023  Length of Stay: 18 days  Attending Physician: Jean Pierre Chappell DO  Primary Care Provider: LIZETTE Taylor        Subjective:     Principal Problem:Physical deconditioning        HPI:  Mr. Welsh is a 83 y/o WM with a PMH of HTN, CHF, COPD, CAD s/p stent, and anxiety disorder who presents for admission to [facility] services. He was previously seen at [facility] on 4/5/23 with increased SOB. He was found to have CAP and CHF and treated with Lasix with no improvement of symptoms. He was then transferred to [facility] at family request for higher level of care. CTA PE protocol was performed and negative for PE. He was also evaluated for worsening left leg weakness with onset 11/22 and new onset right leg weakness. A MRI brain and spine was performed due to BLE weakness and was negative. He was started on Zosyn for pneumonia but his leukocytosis worsened. He was the seed by Infectious Disease and started on Cefdinir, Acyclovir, and Ampicillin for empiric meningitis coverage. Patient was unable to tolerate a LP. He demonstrated clinical improvement with this treatment, his leukocytosis trended down, and his oxygenation status returned to baseline. ID recommended a 14 day course of abx at discharge. Patient was evaluated by PT and OT and comprehensive inpatient rehabilitation  was recommended. Patient's daughter, who is a NP, request admission to [facility] for rehabilitation services.      Overview/Hospital Course:  5/4 Patient is sitting up in Luciano chair watching TV in [facility]. Son is at bedside. He is pleasant and denies any discomfort or sob at present. 1+ edema noted BLE. Patient encouraged to elevate lower extremities as much as possible. Patient appears upbeat about PT/OT and expresses hope it will strengthen his lower  extremities and increase his mobility and independence.    5/8 Seen today, no major issues.  Went to therapy.  Sitting in WC today.  No complaints.     5/10 Asking about Mucinex for sputum.  Will add that BID as well as Mucomyst prn.  Also discussed with patient and family about use of disrespectful language and cursing towards our staff.  Told them it will not be tolerated and next time it happens he will be discharged home with home health.  I instructed him to tell family to apologize to staff.     5/12 Trying some nebs and mucomyst for sputum relief and production.  Doing well with therapy.  Showing improvement.     5/15 Seems better today, feels better. No major issues. Feels like he is getting stronger.     5/17 [facility]  Will [facility].  Continues to do therapy.     5/19 No major issues, K a little low this am will adjust PO K supplements.     5/22 Sleepy this am, continues therapy.  Looking at NH placement and referrals sent.       No new subjective & objective note has been filed under this hospital service since the last note was generated.      Assessment/Plan:      Hypokalemia  Adjust PO K and follow.       HTN (hypertension)  Continue home meds.  Adjust as needed.       CAD (coronary artery disease)  Stable.       Chronic systolic heart failure  Continue meds, monitor weights.  Cardiac diet.       Chronic obstruct airways disease  Nebs, O2.  Monitor.         VTE Risk Mitigation (From admission, onward)         Ordered     IP VTE LOW RISK PATIENT  Once         05/04/23 1924                Discharge Planning   MARIZA:      Code Status: Full Code   Is the patient medically ready for discharge?: No    Reason for patient still in hospital (select all that apply): Patient trending condition and PT / OT recommendations  Discharge Plan A: Home with family, Home Health                  Jean Pierre Chappell DO  Department of Hospital Medicine   [facility]

## 2023-05-22 NOTE — PLAN OF CARE
Problem: Functional Ability Impaired COPD (Chronic Obstructive Pulmonary Disease)  Goal: Optimal Level of Functional [place]  Outcome: Ongoing, Progressing  Intervention: Optimize Functional Ability  Flowsheets (Taken 5/22/2023 1715)  Self-Care Promotion: BADL personal objects within reach  Activity Management: Up in chair - L3  Environmental Support:   calm environment promoted   distractions minimized   environmental consistency promoted     Problem: Fatigue  Goal: Improved Activity Tolerance  Outcome: Ongoing, Not Progressing  Intervention: Promote Improved Energy  Flowsheets (Taken 5/22/2023 1715)  Fatigue Management: frequent rest breaks encouraged  Sleep/Rest Enhancement:   consistent schedule promoted   awakenings minimized   family presence promoted   noise level reduced   regular sleep/rest pattern promoted  Activity Management: Up in chair - L3

## 2023-05-22 NOTE — PT/OT/SLP PROGRESS
"Occupational Therapy      Patient Name:  Shaji Welsh   MRN:  [id number]    Patient not seen today secondary to Patient unwilling to participate (pt tells LPTA "I'm just barely livin."  He tells therapy staff he can't do anything today.). Will follow-up tomorrow.    5/22/2023  "

## 2023-05-22 NOTE — PROGRESS NOTES
"Patient requested to withhold from PT treatment this date due to reports of shortness of breath with movements, and patient stating that he was "barely alive." PTA instructed patient to have nursing call PT department if he wants to participate later this afternoon.   "

## 2023-05-22 NOTE — PT/OT/SLP PROGRESS
"Occupational Therapy      Patient Name:  Shaji Welsh   MRN:  [id number]    Patient not seen today secondary to Patient unwilling to participate, Nurse/ BLACK hold (nursing reports, "he's not doing good.  He's very swelled.  He says he doesn't want to do anything right now, maybe this afternoon."). Will follow-up this afternoon.    5/22/2023  "

## 2023-05-22 NOTE — PLAN OF CARE
Problem: Breathing Pattern Ineffective  Goal: Effective Breathing Pattern  Outcome: Ongoing, Progressing     Problem: Gas Exchange Impaired  Goal: Optimal Gas Exchange  Outcome: Ongoing, Progressing     Problem: Adjustment to Illness COPD (Chronic Obstructive Pulmonary Disease)  Goal: Optimal Chronic Illness Coping  Outcome: Ongoing, Progressing     Problem: Functional Ability Impaired COPD (Chronic Obstructive Pulmonary Disease)  Goal: Optimal Level of Functional [place]  Outcome: Ongoing, Progressing     Problem: Infection COPD (Chronic Obstructive Pulmonary Disease)  Goal: Absence of Infection Signs and Symptoms  Outcome: Ongoing, Progressing     Problem: Oral Intake Inadequate COPD (Chronic Obstructive Pulmonary Disease)  Goal: Improved Nutrition Intake  Outcome: Ongoing, Progressing     Problem: Respiratory Compromise COPD (Chronic Obstructive Pulmonary Disease)  Goal: Effective Oxygenation and Ventilation  Outcome: Ongoing, Progressing

## 2023-05-23 NOTE — PLAN OF CARE
Problem: Adult Inpatient Plan of Care  Goal: Optimal Comfort and Wellbeing  Outcome: Ongoing, Progressing   Preferred breathing techniques offered to patient and family.

## 2023-05-23 NOTE — PROGRESS NOTES
Patient not seen for PT treatment this date due to patient being asleep during both of PT treatment attempts, and OT noting that patient did not feel well and patient not breathing well this date. Will follow up tomorrow.

## 2023-05-23 NOTE — PROGRESS NOTES
"[facility]    Clinical Nutrition Follow-Up         Date: 5/23/2023     Consult received re: Assess/educate regarding nutritional needs     Shaji Welsh is a 82 y.o. male with   Active Hospital Problems    Diagnosis  POA    *Physical deconditioning [R53.81]  Yes    Hypokalemia [E87.6]  No    Weakness of both lower extremities [R29.898]  Yes    Chronic obstruct airways disease [J44.9]  Yes    Chronic systolic heart failure [I50.22]  Yes    CAD (coronary artery disease) [I25.10]  Yes    HTN (hypertension) [I10]  Yes      Resolved Hospital Problems   No resolved problems to display.       PMH:  has a past medical history of CHF (congestive heart failure), COPD (chronic obstructive pulmonary disease), and Coronary artery disease.    Nutrition/Diet History  Spiritual, Cultural Beliefs, [employer] Practices, Values that Affect Care: no    Diet Order: Diet Cardiac  Oral Supplements: none     5/9: Fair appetite. Po intake ranging from 25%-100%.   5/16: Good appetite. Eating % meals.   5/23: Good appetite. Eating % meals.    Anthropometrics:   Height: 5' 6" (167.6 cm)  Weight (5/9/23): 91.1 kg (200 lb 14.4 oz)  IBW: 142 lbs   BMI (Calculated): 32.4  BMI Classification: Obese    5/12/23: 92.6 kg   5/13/23 - 5/16/23: Refused weights  5/23: no new wt available - "Unable to weight, too dangerous for patient and staff"     Labs:   No results for input(s): NA, K, BUN, CREATININE, GLU, CALCIUM, ALBUMIN, CL, ALT, AST, PHOS in the last 72 hours.  Last A1c: No results found for: HGBA1C  Lab Results   Component Value Date    RBC 4.23 (L) 05/19/2023    HGB 11.2 (L) 05/19/2023    HCT 37.1 (L) 05/19/2023    MCV 87.7 05/19/2023    MCH 26.5 (L) 05/19/2023    MCHC 30.2 (L) 05/19/2023       Meds:   Scheduled Meds:   aspirin  81 mg Oral Daily    atorvastatin  20 mg Oral QHS    calcium-vitamin D3  1 tablet Oral Daily    clopidogreL  75 mg Oral Daily    diltiaZEM  240 mg Oral Daily    " fluticasone-salmeterol 250-50 mcg/dose  1 puff Inhalation BID    furosemide  40 mg Oral Daily    guaiFENesin  600 mg Oral BID    potassium chloride SA  40 mEq Oral Daily    sertraline  50 mg Oral QHS    zinc oxide   Topical (Top) Daily     Continuous Infusions: none   PRN Meds:.acetaminophen, acetylcysteine 200 mg/ml (20%), albuterol sulfate, calcium carbonate, hydrOXYzine pamoate, melatonin, senna-docusate 8.6-50 mg, traMADoL    Physical Review:  General: glasses at bedside, 2L NC   Abd/GI: rounded, obese   Last Bowel Movement: 23  Ext:  +3-+4 generalized edema   Skin: incontinence associated dermatitis to midline buttocks    Bran Score:   Bran Risk Assessment  Sensory Perception: 4-->no impairment  Moisture: 3-->occasionally moist  Activity: 2-->chairfast  Mobility: 2-->very limited  Nutrition: 3-->adequate  Friction and Shear: 2-->potential problem  Bran Score: 16    Malnutrition Screening Tool  Have you recently lost weight without trying?: No  Weight loss score: 0  Have you been eating poorly because of a decreased appetite?: No  Appetite score: 0  MST Score: 0    Estimated Nutrition Needs:   18-22 kcal/[id number] kcal/day  1.2-1.5 g pro/kg IBW: 78-98 g protein/day   2841-7757 ml fluid/day      Recommendations/ Intervention:  Continue cardiac diet as tolerated    Encourage po intake and fluids    Daily weights    RD to monitor po intake,wt,nutrition-related labs, skin changes, and meds     Nutrition Risk: moderate    Signed  Dominga Jimenez, MS, RD, LD  Available via [employer]

## 2023-05-23 NOTE — PROGRESS NOTES
Clinical Pharmacy Chart Review Note      Admit Date: 5/4/2023   LOS: 19 days       Shaji Welsh is a 82 y.o. male admitted to SNF for PT/OT after hospitalization for acute respiratory failure.    Active Hospital Problems    Diagnosis  POA    *Physical deconditioning [R53.81]  Yes    Hypokalemia [E87.6]  No    Weakness of both lower extremities [R29.898]  Yes    Chronic obstruct airways disease [J44.9]  Yes    Chronic systolic heart failure [I50.22]  Yes    CAD (coronary artery disease) [I25.10]  Yes    HTN (hypertension) [I10]  Yes      Resolved Hospital Problems   No resolved problems to display.     Review of patient's allergies indicates:  No Known Allergies  Patient Active Problem List    Diagnosis Date Noted    Hypokalemia 05/19/2023    Physical deconditioning 05/05/2023    Weakness of both lower extremities 05/05/2023    Chronic obstruct airways disease 05/05/2023    Chronic systolic heart failure 05/05/2023    CAD (coronary artery disease) 05/05/2023    HTN (hypertension) 05/05/2023       Scheduled Meds:    aspirin  81 mg Oral Daily    atorvastatin  20 mg Oral QHS    calcium-vitamin D3  1 tablet Oral Daily    clopidogreL  75 mg Oral Daily    diltiaZEM  240 mg Oral Daily    fluticasone-salmeterol 250-50 mcg/dose  1 puff Inhalation BID    furosemide  40 mg Oral Daily    guaiFENesin  600 mg Oral BID    potassium chloride SA  40 mEq Oral Daily    sertraline  50 mg Oral QHS    zinc oxide   Topical (Top) Daily     Continuous Infusions:   PRN Meds: acetaminophen, acetylcysteine 200 mg/ml (20%), albuterol sulfate, calcium carbonate, hydrOXYzine pamoate, melatonin, senna-docusate 8.6-50 mg, traMADoL    OBJECTIVE:     Vital Signs (Last 24H)  Temp:  [97.4 °F (36.3 °C)-98.8 °F (37.1 °C)]   Pulse:  [82-90]   Resp:  [18-24]   BP: (126-138)/(54-69)   SpO2:  [92 %-98 %]     Laboratory:  CBC:   Recent Labs   Lab 05/19/23  0641   WBC 13.94*   RBC 4.23*   HGB 11.2*   HCT 37.1*      MCV 87.7   MCH 26.5*   MCHC 30.2*      BMP:   Recent Labs   Lab 05/19/23  0641   *      K 3.1*   CL 96*   CO2 36*   BUN 15   CREATININE 0.76   CALCIUM 9.0     .    ASSESSMENT/PLAN:     Estimated Creatinine Clearance: 82.6 mL/min (based on SCr of 0.76 mg/dL).Medications reviewed, no dose adjustments needed. Weekly swing bed medication regimen review complete.  Will continue to monitor.  Gonsalo Newman, Pharm. D.  Director of Pharmacy  [facility]  [phone]  [email or web address]

## 2023-05-23 NOTE — PLAN OF CARE
Problem: Adult Inpatient Plan of Care  Goal: Absence of Hospital-Acquired Illness or Injury  Outcome: Ongoing, Progressing  Intervention: Identify and Manage Fall Risk  Flowsheets (Taken 5/23/2023 1816)  Safety Promotion/Fall Prevention:   assistive device/personal item within reach   family to remain at bedside   room near unit station   side rails raised x 3   instructed to call staff for mobility   Fall Risk reviewed with patient/family  Intervention: Prevent Skin Injury  Flowsheets (Taken 5/23/2023 1816)  Body Position: position maintained  Intervention: Prevent Infection  Flowsheets (Taken 5/23/2023 1816)  Infection Prevention:   hand hygiene promoted   rest/sleep promoted   single patient room provided     Problem: Fall Injury Risk  Goal: Absence of Fall and Fall-Related Injury  Outcome: Ongoing, Progressing  Intervention: Identify and Manage Contributors  Flowsheets (Taken 5/23/2023 1816)  Self-Care Promotion:   independence encouraged   safe use of adaptive equipment encouraged  Medication Review/Management: medications reviewed

## 2023-05-23 NOTE — PLAN OF CARE
Problem: Breathing Pattern Ineffective  Goal: Effective Breathing Pattern  Outcome: Ongoing, Progressing     Problem: Gas Exchange Impaired  Goal: Optimal Gas Exchange  Outcome: Ongoing, Progressing     Problem: Adjustment to Illness COPD (Chronic Obstructive Pulmonary Disease)  Goal: Optimal Chronic Illness Coping  Outcome: Ongoing, Progressing     Problem: Functional Ability Impaired COPD (Chronic Obstructive Pulmonary Disease)  Goal: Optimal Level of Functional [place]  Outcome: Ongoing, Progressing     Problem: Infection COPD (Chronic Obstructive Pulmonary Disease)  Goal: Absence of Infection Signs and Symptoms  Outcome: Ongoing, Progressing     Problem: Oral Intake Inadequate COPD (Chronic Obstructive Pulmonary Disease)  Goal: Improved Nutrition Intake  Outcome: Ongoing, Progressing     Problem: Respiratory Compromise COPD (Chronic Obstructive Pulmonary Disease)  Goal: Effective Oxygenation and Ventilation  Outcome: Ongoing, Progressing

## 2023-05-23 NOTE — PLAN OF CARE
05/23/23 1208   Discharge Reassessment   Assessment Type Discharge Planning Reassessment   Communicated MARIZA with patient/caregiver Date not available/Unable to determine   Discharge Plan A New Nursing Home placement - care home care facility;Skilled Nursing Facility;Home Health;Home with family   Transition of Care Barriers Nursing Home rejection   Why the patient remains in the hospital Requires continued medical care   Post-Acute Status   Post-Acute Placement Status Referrals Sent     Mr. Welsh discussed in multidisciplinary team meeting. I have spoken with [facility] in [place] and sent updated clinicals. [facility] [place] states they have no male beds available. Will discuss other dc options with family.     More placement options include [facility] and [facility]. Referrals sent per daughters request. We also discussed the possibility of utilizing hospice services. I offered to contact hospice companies such as [facility] or [facility] but Marta Cottrell would like to discuss with her father and brother first.

## 2023-05-23 NOTE — PT/OT/SLP PROGRESS
Occupational Therapy      Patient Name:  Shaji Welsh   MRN:  [id number]    Patient not seen today secondary to Patient unwilling to participate, Patient fatigue, Nurse/ BLACK hold due to pt not feeling good and having difficulty breathing today. Will follow-up tomorrow.    5/23/2023

## 2023-05-24 PROBLEM — Z91.89: Status: ACTIVE | Noted: 2023-01-01

## 2023-05-24 PROBLEM — R06.02 SHORTNESS OF BREATH: Status: ACTIVE | Noted: 2023-01-01

## 2023-05-24 NOTE — PT/OT/SLP PROGRESS
"Occupational Therapy      Patient Name:  Shaji Welsh   MRN:  [id number]    Patient not seen today secondary to Nurse/ BLACK hold (see nursing notes; also talked to his daughter who states, "he is just giving up."). Will follow-up tomorrow, though pt daughter also states they are planning to take him home on hospice care.    5/24/2023  "

## 2023-05-24 NOTE — PROGRESS NOTES
Patient's O2 sats were @ 96% on PRB 60%.  Decreased patient to Venti-Mask 50%.  Sats @ 92% on Venti-Mask 50%.  Will continue to wean O2 as tolerated.  Will continue to monitor patient as tolerated.

## 2023-05-24 NOTE — RESPIRATORY THERAPY
Pt in resp distress with increase hr increase rr  Sa02 79% on 3 lpm. BBS dimminished all lobes with increase wob. Albuterol tx given x3  Followed by pulmicort tx. Pt feels better back to baseline states he must of have dropped off to sleep during last tx. Sao2 97% hr, rr and BBS back down to baseline.

## 2023-05-24 NOTE — NURSING
Upon start of  shift, CNA attempted to get vitals and Patient was very SOB and aggitated. He refused CNA to get vitals and son told her later when he calms down. Family requested RN to give his night time meds to help calm patient. Rn pulled vistaril and tramadol with routine night meds .Son spoke up and told nurse to not give the vistaril at this time, but wait  with the medication.

## 2023-05-24 NOTE — NURSING
ED provider IRMA Case FNP ordered morphine 3 mg IM for pts SOB, anxiety.  Will continue to monitor.

## 2023-05-24 NOTE — NURSING
Patient is very short of breathe, and his breathing is very labored. ED provider is notified of pateint condition.

## 2023-05-24 NOTE — PROGRESS NOTES
FAMILY MEETING  ----------------------------------------------------------  Medical Staff Present: Dr. Garsia    Patient/Family/Others Present:Daughter Marta Cottrell, patient's wife, patient's son    Time Start - Time Stop: 4:45-5:15    Summary of Clinical Course Discussion: Discussed change in the patient's condition including worsened respiratory status and decreased level of consciousness    Summary of Patient/Surrogate Goals: To be comfortable, to pass away peacefully    Clinical Decision Made (new limitations to care, treatment plans, etc.):     Change focus to comfort care measures only.   Discussed recently changed code status from Full Code to DNR (this was done earlier in the week.) The patient's wife (his legal health care proxy) and daughter were both in agreement that the patient's goals were not to prolong life but rather to be comfortable and reduce suffering. They reiterated the patients wishes to avoid escalation to a higher level of care including mechanical ventilation and ACLS measures.  Completed MS POST    A care conference was held as described above. All questions were answered to participants apparent satisfaction.      COMFORT RECOMMENDATIONS:  - please de-escalate code status to DNR/DNI/Comfort  - Please request private room for end of life care  - Please discontinue all non-essential medications not contributing to comfort, including PO/enteral medications (or transition to sublingual, IV or rectal administration)  - May discontinue IV fluids as excess fluid at end of life can lead to pulmonary congestion causing dyspnea  - May discontinue labs, imaging, finger sticks, insulin/injections as these will not contribute to comfort or change prognosis  - Change vital signs to qshift  - Nursing communication order to continue q2h turning and q4h oral (continue chlorhexadine order)

## 2023-05-24 NOTE — PROGRESS NOTES
Patient's O2 sats were 98% on PRB 60%.  I decreased patient to Venti-Mask 50% and his sats decreased to 86%.  I placed patient back on PRB 60% to maintain sats.  O2 sats @ 96% on PRB 60%.  Patient seems to be tolerating PRB at this this time.  Will try to continue to wean O2 with sats > 90% as order.  Will continue to monitor patient closely.

## 2023-05-24 NOTE — NURSING
Pt received Morphine 3 mg IM. He is now breathing deeper and diminished breathe sounds  continue in all lung fields.

## 2023-05-24 NOTE — PROGRESS NOTES
Patient not seen for PT treatment this date due to nursing stating to withhold from treatment this date, and patient possibly going home on hospice.

## 2023-05-24 NOTE — PLAN OF CARE
Problem: Adult Inpatient Plan of Care  Goal: Optimal Comfort and Wellbeing  Outcome: Ongoing, Progressing     Problem: Adult Inpatient Plan of Care  Goal: Absence of Hospital-Acquired Illness or Injury  Outcome: Ongoing, Progressing     Problem: Breathing Pattern Ineffective  Goal: Effective Breathing Pattern  Outcome: Ongoing, Progressing

## 2023-05-24 NOTE — PT/OT/SLP DISCHARGE
Occupational Therapy Discharge Summary    Shaji Welsh  MRN: [id number]   Principal Problem: Physical deconditioning      Patient Discharged from acute Occupational Therapy on 2023.  Please refer to prior OT note dated today for functional status.    Assessment:      Patient transferred to lower level of care secondary to deteriorating condition and need of comfort care measures.    Objective:     GOALS:   Multidisciplinary Problems       Occupational Therapy Goals          Problem: Occupational Therapy    Goal Priority Disciplines Outcome Interventions   Occupational Therapy Goal     OT, PT/OT Unable to Meet, Plan Revised    Description: STG: within 2 weeks  Pt will perform grooming with setup and independence at sinkside from sitting or standing MET  Pt will bathe with SBA with spongebathing at sinkside ONGOING/ NOT PROGRESSING  Pt will perform UE dressing with setup and independence ONGOING/NOT PROGRESSING  Pt will perform LE dressing with SBA with AE ONGOING/ NOT PROGRESSING  Pt will sit EOB x 30 min with no assistance ONGOING/ NOT PROGRESSING  Pt will transfer bed/chair/bsc with mod I ONGOING/NOT PROGRESSING WELL  Pt will perform standing task x 5 min with svn assistance ONGOING/NOT PROGRESSING WELL  Pt will tolerate 45 minutes of tx without fatigue ONGOING/NOT PROGRESSING AS well as expected      LT.Restore to max I with self care and mobility.    Patient unable to continue working toward therapy goals due to condition deteriorated and pt family requesting comfort care at this time.                          Reasons for Discontinuation of Therapy Services  Therapist determines that the patient will no longer benefit from therapy services.      Plan:     Patient Discharged to: Palliative Care/Hospice    2023

## 2023-05-24 NOTE — PROGRESS NOTES
Patient's O2 sats @ 88% on Venti-Mask 50%.  Breathing treatment given.  Patient's O2 sats decreased to 84% on breathing treatment.  Placed patient back on PRB to maintain O2 sats.  O2 sats @ 88% on PRB.  Patient placed back on NRB to maintain sats.

## 2023-05-24 NOTE — NURSING
Dr. Garsia here to see patient and speak with family regarding orders for sustaining treatment. Daughter and wife both express the patients and family's wishes for comfort measures only.    Self

## 2023-05-24 NOTE — CARE UPDATE
0609 I spoke with Christian Case NP and Judy Grier NP regarding the deterioration of Mr. Welsh per Jeanne Ace's notes. Christian Case, VENKAT reports patient was given Morphine and that improved his breathing and he was able to rest. We discussed comfort measures/sending patient home with hospice vs nursing home placement.     8898 I spoke with floor nurse, she reports patient is still resting.     5058 Marta Cottrell, patient's daughter reports patient is nonverbal and will not answer her which is a change from yesterday when she saw him. Providing comfort measures only discussed with patient, daughter and son at bedside.

## 2023-05-24 NOTE — PLAN OF CARE
Problem: Occupational Therapy  Goal: Occupational Therapy Goal  Description: STG: within 2 weeks  Pt will perform grooming with setup and independence at sinkside from sitting or standing MET  Pt will bathe with SBA with spongebathing at sinkside ONGOING/ NOT PROGRESSING  Pt will perform UE dressing with setup and independence ONGOING/NOT PROGRESSING  Pt will perform LE dressing with SBA with AE ONGOING/ NOT PROGRESSING  Pt will sit EOB x 30 min with no assistance ONGOING/ NOT PROGRESSING  Pt will transfer bed/chair/bsc with mod I ONGOING/NOT PROGRESSING WELL  Pt will perform standing task x 5 min with svn assistance ONGOING/NOT PROGRESSING WELL  Pt will tolerate 45 minutes of tx without fatigue ONGOING/NOT PROGRESSING AS well as expected      LT.Restore to max I with self care and mobility.    Patient unable to continue working toward therapy goals due to condition deteriorated and pt family requesting comfort care at this time.     Outcome: Unable to Meet, Plan Revised

## 2023-05-24 NOTE — PLAN OF CARE
Problem: Adult Inpatient Plan of Care  Goal: Plan of Care Review  Outcome: Ongoing, Progressing  Goal: Patient-Specific Goal (Individualized)  Outcome: Ongoing, Progressing  Goal: Absence of Hospital-Acquired Illness or Injury  Outcome: Ongoing, Progressing  Goal: Optimal Comfort and Wellbeing  Outcome: Ongoing, Progressing     Problem: Skin Injury Risk Increased  Goal: Skin Health and Integrity  Outcome: Ongoing, Progressing     Problem: Breathing Pattern Ineffective  Goal: Effective Breathing Pattern  Outcome: Ongoing, Progressing     Problem: Gas Exchange Impaired  Goal: Optimal Gas Exchange  Outcome: Ongoing, Progressing     Problem: Adjustment to Illness COPD (Chronic Obstructive Pulmonary Disease)  Goal: Optimal Chronic Illness Coping  Outcome: Ongoing, Progressing     Problem: Functional Ability Impaired COPD (Chronic Obstructive Pulmonary Disease)  Goal: Optimal Level of Functional Clark  Outcome: Ongoing, Progressing     Problem: Infection COPD (Chronic Obstructive Pulmonary Disease)  Goal: Absence of Infection Signs and Symptoms  Outcome: Ongoing, Progressing     Problem: Oral Intake Inadequate COPD (Chronic Obstructive Pulmonary Disease)  Goal: Improved Nutrition Intake  Outcome: Ongoing, Progressing     Problem: Respiratory Compromise COPD (Chronic Obstructive Pulmonary Disease)  Goal: Effective Oxygenation and Ventilation  Outcome: Ongoing, Progressing

## 2023-05-25 NOTE — NURSING
0400 no respirations, no pulse was found on pt. Judy Grier was in room.  was notified, SORIANO was notified.   home was notified.  At 4:15 Marcella returned call. Case number [id number]. Donation ruled out due to medical history. REP was Jaylyn.    Cordell Garvey came to release body to  home. Glenn Lizarraga  Home [facility] was notified. 06:40  home is here to get body.  Family is with body.

## 2023-05-25 NOTE — PROGRESS NOTES
[facility] Medicine  Progress Note    Patient Name: Shaji Welsh  MRN: [id number]  Patient Class: IP- Swing   Admission Date: 5/4/2023  Length of Stay: 20 days  Attending Physician: Ed Garsia MD  Primary Care Provider: LIZETTE Taylor        Subjective:     Principal Problem:Physical deconditioning        HPI:  Mr. Welsh is a 83 y/o WM with a PMH of HTN, CHF, COPD, CAD s/p stent, and anxiety disorder who presents for admission to [facility] services. He was previously seen at [facility] on 4/5/23 with increased SOB. He was found to have CAP and CHF and treated with Lasix with no improvement of symptoms. He was then transferred to [facility] at family request for higher level of care. CTA PE protocol was performed and negative for PE. He was also evaluated for worsening left leg weakness with onset 11/22 and new onset right leg weakness. A MRI brain and spine was performed due to BLE weakness and was negative. He was started on Zosyn for pneumonia but his leukocytosis worsened. He was the seed by Infectious Disease and started on Cefdinir, Acyclovir, and Ampicillin for empiric meningitis coverage. Patient was unable to tolerate a LP. He demonstrated clinical improvement with this treatment, his leukocytosis trended down, and his oxygenation status returned to baseline. ID recommended a 14 day course of abx at discharge. Patient was evaluated by PT and OT and comprehensive inpatient rehabilitation  was recommended. Patient's daughter, who is a NP, request admission to [facility] for rehabilitation services.      Overview/Hospital Course:  5/4 Patient is sitting up in Luciano chair watching TV in [facility]. Son is at bedside. He is pleasant and denies any discomfort or sob at present. 1+ edema noted BLE. Patient encouraged to elevate lower extremities as much as possible. Patient appears upbeat about PT/OT and expresses hope it will strengthen his lower  extremities and increase his mobility and independence.    5/8 Seen today, no major issues.  Went to therapy.  Sitting in WC today.  No complaints.     5/10 Asking about Mucinex for sputum.  Will add that BID as well as Mucomyst prn.  Also discussed with patient and family about use of disrespectful language and cursing towards our staff.  Told them it will not be tolerated and next time it happens he will be discharged home with home health.  I instructed him to tell family to apologize to staff.     5/12 Trying some nebs and mucomyst for sputum relief and production.  Doing well with therapy.  Showing improvement.     5/15 Seems better today, feels better. No major issues. Feels like he is getting stronger.     5/17 [facility]  Will refer.  Continues to do therapy.     5/19 No major issues, K a little low this am will adjust PO K supplements.     5/22 Sleepy this am, continues therapy.  Looking at NH placement and referrals sent.     5/24 1300 called to pt room by Genia TRACY.  Pt daughter reports is grimacing and increased work of breathing.  She request pain control and comfort measures only.  When asked pt if he is in pain, he nodded head. He did not respond verbally but is grimacing.  Pt's daughter Marta Cottrell reports that he recently signed his DNR and he nor the family want heroic efforts for his worsening condition.  She reports they talked about hospice yesterday.  Will treat pain and contact MD on call to discuss further action. Pt son and grand-daughter also in the room agree with comfort care of patient.      5/24 1325 Discussed pt status, family wishes and POC with Dr. Garsia who agrees with comfort care, change lasix to IV , ribera and prn Morphine.       Interval History: Has had a big decline since yesterday; minimally responsive with agonal breathing and hypoxia.     Review of Systems  Unable to obtain    Objective:     Vital Signs (Most Recent):  Temp:  (family refused to have  vitals assessed.) (05/24/23 2204)  Pulse: 100 (05/24/23 1258)  Resp: (!) 24 (05/24/23 2009)  BP: (!) 155/73 (05/24/23 0801)  SpO2: (!) 91 % (05/24/23 1258) Vital Signs (24h Range):  Temp:  [97.2 °F (36.2 °C)] 97.2 °F (36.2 °C)  Pulse:  [] 100  Resp:  [20-28] 24  SpO2:  [86 %-98 %] 91 %  BP: (155)/(73) 155/73     Weight:  (unable to weigh, too dangerous for patient and staff)  Body mass index is 32.96 kg/m².    Intake/Output Summary (Last 24 hours) at 5/24/2023 2210  Last data filed at 5/24/2023 2156  Gross per 24 hour   Intake 0 ml   Output 700 ml   Net -700 ml         Physical Exam     Gen: minimally responsive; appears somewhat uncomfortable   HEENT: O2 NC in place  CV: fast  rate, regular rhythm;  Pulm: increased rate and work of breathing; using accessory muscles   Abdomen: soft, non-tender   : ribera catheter in place   Extremities:  cool   Skin: no suspicious lesions;   Neuro: minimally responsive l          Assessment/Plan:      At risk for pain  - Morphine 2 mg IV q2h prn  - Tylenol suppository 650 mg q4h prn fever   - may consider lorazepam IV q4h prn for agitation/restless/anxiety.    Shortness of breath  Opioids as above      COMFORT RECOMMENDATIONS:  - please de-escalate code status to DNR/DNI/Comfort  - Please request private room for end of life care  - Please discontinue all non-essential medications not contributing to comfort, including PO/enteral medications (or transition to sublingual, IV or rectal administration)  - May discontinue IV fluids as excess fluid at end of life can lead to pulmonary congestion causing dyspnea  - May discontinue labs, imaging, finger sticks, insulin/injections as these will not contribute to comfort or change prognosis  - Change vital signs to qshift  - Nursing communication order to continue q2h turning and q4h oral (continue chlorhexadine order)              Ed Garsia MD  Department of Hospital Medicine   [facility]

## 2023-05-25 NOTE — PLAN OF CARE
Problem: Adult Inpatient Plan of Care  Goal: Readiness for Transition of Care  5/24/2023 2338 by Jeanne Ace, RN  Outcome: Unable to Meet, Plan Revised  5/24/2023 2336 by Jeanne Ace, RN  Outcome: Unable to Meet, Plan Revised

## 2023-05-25 NOTE — NURSING
Family called RN into room at 3:48 with LPN as patient had quit breathing.  Upon exam LPN reported 2 heart beats in one minute. Unable to hear breath sounds or feel pulses.   ED provider was notified for assessment.

## 2023-05-25 NOTE — ASSESSMENT & PLAN NOTE
- Morphine 2 mg IV q2h prn  - Tylenol suppository 650 mg q4h prn fever   - may consider lorazepam IV q4h prn for agitation/restless/anxiety.

## 2023-05-25 NOTE — SUBJECTIVE & OBJECTIVE
Interval History: Has had a big decline since yesterday; minimally responsive with agonal breathing and hypoxia.     Review of Systems  Unable to obtain    Objective:     Vital Signs (Most Recent):  Temp:  (family refused to have vitals assessed.) (05/24/23 2204)  Pulse: 100 (05/24/23 1258)  Resp: (!) 24 (05/24/23 2009)  BP: (!) 155/73 (05/24/23 0801)  SpO2: (!) 91 % (05/24/23 1258) Vital Signs (24h Range):  Temp:  [97.2 °F (36.2 °C)] 97.2 °F (36.2 °C)  Pulse:  [] 100  Resp:  [20-28] 24  SpO2:  [86 %-98 %] 91 %  BP: (155)/(73) 155/73     Weight:  (unable to weigh, too dangerous for patient and staff)  Body mass index is 32.96 kg/m².    Intake/Output Summary (Last 24 hours) at 5/24/2023 2210  Last data filed at 5/24/2023 2156  Gross per 24 hour   Intake 0 ml   Output 700 ml   Net -700 ml         Physical Exam     Gen: minimally responsive; appears somewhat uncomfortable   HEENT: O2 NC in place  CV: fast  rate, regular rhythm;  Pulm: increased rate and work of breathing; using accessory muscles   Abdomen: soft, non-tender   : ribera catheter in place   Extremities:  cool   Skin: no suspicious lesions;   Neuro: minimally responsive l

## 2023-05-25 NOTE — DISCHARGE SUMMARY
[facility]  Hospital Medicine  Discharge Summary      Patient Name: Shaji Welsh  MRN: [id number]  [facility]: [id number]  Patient Class: IP- Swing  Admission Date: 5/4/2023  Hospital Length of Stay: 21 days  Discharge Date and Time:  05/25/2023 4:36 AM  Attending Physician: Jean Pierre Chappell DO   Discharging Provider: Judy Grier FER  Primary Care Provider: Marta Stroud FER    Primary Care Team: Networked reference to record PCT     HPI:   Mr. Welsh is a 83 y/o WM with a PMH of HTN, CHF, COPD, CAD s/p stent, and anxiety disorder who presents for admission to [facility] services. He was previously seen at [facility] on 4/5/23 with increased SOB. He was found to have CAP and CHF and treated with Lasix with no improvement of symptoms. He was then transferred to [facility] at family request for higher level of care. CTA PE protocol was performed and negative for PE. He was also evaluated for worsening left leg weakness with onset 11/22 and new onset right leg weakness. A MRI brain and spine was performed due to BLE weakness and was negative. He was started on Zosyn for pneumonia but his leukocytosis worsened. He was the seed by Infectious Disease and started on Cefdinir, Acyclovir, and Ampicillin for empiric meningitis coverage. Patient was unable to tolerate a LP. He demonstrated clinical improvement with this treatment, his leukocytosis trended down, and his oxygenation status returned to baseline. ID recommended a 14 day course of abx at discharge. Patient was evaluated by PT and OT and comprehensive inpatient rehabilitation  was recommended. Patient's daughter, who is a NP, request admission to [facility] for rehabilitation services.      * No surgery found *      Hospital Course:   5/4 Patient is sitting up in Luciano chair watching TV in [facility]. Son is at bedside. He is pleasant and denies any discomfort or sob at present. 1+ edema noted BLE. Patient encouraged to  elevate lower extremities as much as possible. Patient appears upbeat about PT/OT and expresses hope it will strengthen his lower extremities and increase his mobility and independence.    5/8 Seen today, no major issues.  Went to therapy.  Sitting in WC today.  No complaints.     5/10 Asking about Mucinex for sputum.  Will add that BID as well as Mucomyst prn.  Also discussed with patient and family about use of disrespectful language and cursing towards our staff.  Told them it will not be tolerated and next time it happens he will be discharged home with home health.  I instructed him to tell family to apologize to staff.     5/12 Trying some nebs and mucomyst for sputum relief and production.  Doing well with therapy.  Showing improvement.     5/15 Seems better today, feels better. No major issues. Feels like he is getting stronger.     5/17 States he wants to look at [facility].  Will refer.  Continues to do therapy.     5/19 No major issues, K a little low this am will adjust PO K supplements.     5/22 Sleepy this am, continues therapy.  Looking at NH placement and referrals sent.     5/24 1300 called to pt room by Genia TRACY.  Pt daughter reports is grimacing and increased work of breathing.  She request pain control and comfort measures only.  When asked pt if he is in pain, he nodded head. He did not respond verbally but is grimacing.  Pt's daughter Marta Cottrell reports that he recently signed his DNR and he nor the family want heroic efforts for his worsening condition.  She reports they talked about hospice yesterday.  Will treat pain and contact MD on call to discuss further action. Pt son and grand-daughter also in the room agree with comfort care of patient.      5/24 1325 Discussed pt status, family wishes and POC with Dr. Garsia who agrees with comfort care, change lasix to IV , ribera and prn Morphine.     5/25 0400 Pt with no spontaneous respirations, no apical pulse. Pt dead.  Pt wife and 2 sons at BS.   Discussed pt death with Dr. Azar.  Will release to  home.         Goals of Care Treatment Preferences:  Code Status: DNR      Consults:   Consults (From admission, onward)        Status Ordering Provider     Inpatient consult to Registered Dietitian/Nutritionist  Once        Provider:  (Not yet assigned)    Completed PAO HANDLEY          No new Assessment & Plan notes have been filed under this hospital service since the last note was generated.  Service: Hospital Medicine    Final Active Diagnoses:    Diagnosis Date Noted POA    PRINCIPAL PROBLEM:  Physical deconditioning [R53.81] 2023 Yes    Shortness of breath [R06.02] 2023 Unknown    At risk for pain [Z91.89] 2023 Unknown    Hypokalemia [E87.6] 2023 No    Weakness of both lower extremities [R29.898] 2023 Yes    Chronic obstruct airways disease [J44.9] 2023 Yes    Chronic systolic heart failure [I50.22] 2023 Yes    CAD (coronary artery disease) [I25.10] 2023 Yes    HTN (hypertension) [I10] 2023 Yes      Problems Resolved During this Admission:       Discharged Condition:     Disposition:     Follow Up:    Patient Instructions:   No discharge procedures on file.    Significant Diagnostic Studies: N/A    Pending Diagnostic Studies:     None         Medications:  None    Indwelling Lines/Drains at time of discharge:   Lines/Drains/Airways     Drain  Duration                Urethral Catheter 23 1438 Straight-tip 14 Fr. <1 day                Time spent on the discharge of patient: 30 minutes         LIZETTE Wilson  Department of Hospital Medicine  [facility]

## 2023-05-25 NOTE — PLAN OF CARE
Problem: Adult Inpatient Plan of Care  Goal: Plan of Care Review  5/25/2023 0539 by Jeanne Ace RN  Outcome: Met  5/24/2023 2338 by Jeanne Ace RN  Outcome: Unable to Meet, Plan Revised  5/24/2023 2336 by Jeanne Ace RN  Outcome: Unable to Meet, Plan Revised  Goal: Patient-Specific Goal (Individualized)  5/25/2023 0539 by Jeanne Ace RN  Outcome: Met  5/24/2023 2338 by Jeanne Ace RN  Outcome: Unable to Meet, Plan Revised  5/24/2023 2336 by Jeanne Ace RN  Outcome: Unable to Meet, Plan Revised  Goal: Absence of Hospital-Acquired Illness or Injury  5/25/2023 0539 by Jeanne Ace RN  Outcome: Met  5/24/2023 2338 by Jeanne Ace RN  Outcome: Unable to Meet, Plan Revised  5/24/2023 2336 by Jeanne Ace RN  Outcome: Unable to Meet, Plan Revised  Goal: Optimal Comfort and Wellbeing  5/25/2023 0539 by Jeanne Ace RN  Outcome: Met  5/24/2023 2338 by Jeanne Ace RN  Outcome: Unable to Meet, Plan Revised  5/24/2023 2336 by Jeanne Ace RN  Outcome: Unable to Meet, Plan Revised  Goal: Readiness for Transition of Care  5/25/2023 0539 by Jeanne Ace RN  Outcome: Met  5/24/2023 2338 by Jeanne Ace RN  Outcome: Unable to Meet, Plan Revised  5/24/2023 2336 by Jeanne Ace RN  Outcome: Unable to Meet, Plan Revised   Patient met all goals by this date.